# Patient Record
Sex: MALE | Race: BLACK OR AFRICAN AMERICAN | NOT HISPANIC OR LATINO | Employment: FULL TIME | RURAL
[De-identification: names, ages, dates, MRNs, and addresses within clinical notes are randomized per-mention and may not be internally consistent; named-entity substitution may affect disease eponyms.]

---

## 2020-08-20 ENCOUNTER — HISTORICAL (OUTPATIENT)
Dept: ADMINISTRATIVE | Facility: HOSPITAL | Age: 40
End: 2020-08-20

## 2020-08-21 ENCOUNTER — HISTORICAL (OUTPATIENT)
Dept: ADMINISTRATIVE | Facility: HOSPITAL | Age: 40
End: 2020-08-21

## 2020-08-21 LAB
ALBUMIN SERPL BCP-MCNC: 3.9 G/DL (ref 3.5–5)
ALBUMIN/GLOB SERPL: 1.1 {RATIO}
ALP SERPL-CCNC: 79 U/L (ref 45–115)
ALT SERPL W P-5'-P-CCNC: 21 U/L (ref 16–61)
ANION GAP SERPL CALCULATED.3IONS-SCNC: 9 MMOL/L (ref 7–16)
AST SERPL W P-5'-P-CCNC: 17 U/L (ref 15–37)
BASOPHILS # BLD AUTO: 0.03 X10E3/UL (ref 0–0.2)
BASOPHILS NFR BLD AUTO: 0.5 % (ref 0–1)
BILIRUB SERPL-MCNC: 0.2 MG/DL (ref 0–1.2)
BUN SERPL-MCNC: 19 MG/DL (ref 7–18)
BUN/CREAT SERPL: 20
CALCIUM SERPL-MCNC: 8.4 MG/DL (ref 8.5–10.1)
CHLORIDE SERPL-SCNC: 107 MMOL/L (ref 98–107)
CO2 SERPL-SCNC: 29 MMOL/L (ref 21–32)
CREAT SERPL-MCNC: 0.94 MG/DL (ref 0.7–1.3)
EOSINOPHIL # BLD AUTO: 0.23 X10E3/UL (ref 0–0.5)
EOSINOPHIL NFR BLD AUTO: 3.7 % (ref 1–4)
ERYTHROCYTE [DISTWIDTH] IN BLOOD BY AUTOMATED COUNT: 13.4 % (ref 11.5–14.5)
GLOBULIN SER-MCNC: 3.4 G/DL (ref 2–4)
GLUCOSE SERPL-MCNC: 88 MG/DL (ref 74–106)
HCT VFR BLD AUTO: 40.1 % (ref 40–54)
HGB BLD-MCNC: 13.2 G/DL (ref 13.5–18)
HIV 1+O+2 AB SERPL QL: ABNORMAL
HIV 1+O+2 AB SERPL QL: ABNORMAL
IMM GRANULOCYTES # BLD AUTO: 0.01 X10E3/UL (ref 0–0.04)
IMM GRANULOCYTES NFR BLD: 0.2 % (ref 0–0.4)
LYMPHOCYTES # BLD AUTO: 3.87 X10E3/UL (ref 1–4.8)
LYMPHOCYTES NFR BLD AUTO: 62.1 % (ref 27–41)
MCH RBC QN AUTO: 32 PG (ref 27–31)
MCHC RBC AUTO-ENTMCNC: 32.9 G/DL (ref 32–36)
MCV RBC AUTO: 97.3 FL (ref 80–96)
MONOCYTES # BLD AUTO: 0.49 X10E3/UL (ref 0–0.8)
MONOCYTES NFR BLD AUTO: 7.9 % (ref 2–6)
MPC BLD CALC-MCNC: 11.3 FL (ref 9.4–12.4)
NEUTROPHILS # BLD AUTO: 1.6 X10E3/UL (ref 1.8–7.7)
NEUTROPHILS NFR BLD AUTO: 25.6 % (ref 53–65)
NRBC # BLD AUTO: 0 X10E3/UL (ref 0–0)
NRBC, AUTO (.00): 0 /100 (ref 0–0)
PLATELET # BLD AUTO: 169 X10E3/UL (ref 150–400)
POTASSIUM SERPL-SCNC: 3.8 MMOL/L (ref 3.5–5.1)
PROT SERPL-MCNC: 7.3 G/DL (ref 6.4–8.2)
RBC # BLD AUTO: 4.12 X10E6/UL (ref 4.6–6.2)
SODIUM SERPL-SCNC: 141 MMOL/L (ref 136–145)
WBC # BLD AUTO: 6.23 X10E3/UL (ref 4.5–11)

## 2020-08-24 LAB
HIV 2 AB SERPLBLD QL IA.RAPID: NEGATIVE
HIV1 AB SERPLBLD QL IA.RAPID: POSITIVE

## 2021-01-28 ENCOUNTER — HISTORICAL (OUTPATIENT)
Dept: ADMINISTRATIVE | Facility: HOSPITAL | Age: 41
End: 2021-01-28

## 2021-02-02 ENCOUNTER — HISTORICAL (OUTPATIENT)
Dept: ADMINISTRATIVE | Facility: HOSPITAL | Age: 41
End: 2021-02-02

## 2021-06-09 ENCOUNTER — OFFICE VISIT (OUTPATIENT)
Dept: PRIMARY CARE CLINIC | Facility: CLINIC | Age: 41
End: 2021-06-09
Payer: COMMERCIAL

## 2021-06-09 VITALS
OXYGEN SATURATION: 99 % | DIASTOLIC BLOOD PRESSURE: 96 MMHG | WEIGHT: 174 LBS | TEMPERATURE: 99 F | HEIGHT: 69 IN | HEART RATE: 82 BPM | SYSTOLIC BLOOD PRESSURE: 151 MMHG | BODY MASS INDEX: 25.77 KG/M2

## 2021-06-09 DIAGNOSIS — J06.9 ACUTE RESPIRATORY DISEASE: Primary | ICD-10-CM

## 2021-06-09 PROCEDURE — 96372 THER/PROPH/DIAG INJ SC/IM: CPT | Mod: ,,, | Performed by: NURSE PRACTITIONER

## 2021-06-09 PROCEDURE — 99213 PR OFFICE/OUTPT VISIT, EST, LEVL III, 20-29 MIN: ICD-10-PCS | Mod: 25,,, | Performed by: NURSE PRACTITIONER

## 2021-06-09 PROCEDURE — 96372 PR INJECTION,THERAP/PROPH/DIAG2ST, IM OR SUBCUT: ICD-10-PCS | Mod: ,,, | Performed by: NURSE PRACTITIONER

## 2021-06-09 PROCEDURE — 99213 OFFICE O/P EST LOW 20 MIN: CPT | Mod: 25,,, | Performed by: NURSE PRACTITIONER

## 2021-06-09 RX ORDER — DEXAMETHASONE SODIUM PHOSPHATE 4 MG/ML
4 INJECTION, SOLUTION INTRA-ARTICULAR; INTRALESIONAL; INTRAMUSCULAR; INTRAVENOUS; SOFT TISSUE
Status: COMPLETED | OUTPATIENT
Start: 2021-06-09 | End: 2021-06-09

## 2021-06-09 RX ORDER — CEFTRIAXONE 1 G/1
1 INJECTION, POWDER, FOR SOLUTION INTRAMUSCULAR; INTRAVENOUS
Status: COMPLETED | OUTPATIENT
Start: 2021-06-09 | End: 2021-06-09

## 2021-06-09 RX ORDER — METHYLPREDNISOLONE 4 MG/1
TABLET ORAL
Qty: 1 PACKAGE | Refills: 0 | Status: SHIPPED | OUTPATIENT
Start: 2021-06-09 | End: 2021-06-30

## 2021-06-09 RX ORDER — LISINOPRIL 20 MG/1
20 TABLET ORAL DAILY
COMMUNITY
Start: 2021-04-26 | End: 2023-05-22

## 2021-06-09 RX ORDER — PROMETHAZINE HYDROCHLORIDE AND DEXTROMETHORPHAN HYDROBROMIDE 6.25; 15 MG/5ML; MG/5ML
5 SYRUP ORAL EVERY 4 HOURS PRN
Qty: 175 ML | Refills: 0 | Status: SHIPPED | OUTPATIENT
Start: 2021-06-09 | End: 2021-06-19

## 2021-06-09 RX ORDER — DOXYCYCLINE 100 MG/1
100 CAPSULE ORAL 2 TIMES DAILY
Qty: 20 CAPSULE | Refills: 0 | Status: SHIPPED | OUTPATIENT
Start: 2021-06-09 | End: 2023-03-07

## 2021-06-09 RX ADMIN — CEFTRIAXONE 1 G: 1 INJECTION, POWDER, FOR SOLUTION INTRAMUSCULAR; INTRAVENOUS at 09:06

## 2021-06-09 RX ADMIN — DEXAMETHASONE SODIUM PHOSPHATE 4 MG: 4 INJECTION, SOLUTION INTRA-ARTICULAR; INTRALESIONAL; INTRAMUSCULAR; INTRAVENOUS; SOFT TISSUE at 09:06

## 2021-08-17 ENCOUNTER — OFFICE VISIT (OUTPATIENT)
Dept: PRIMARY CARE CLINIC | Facility: CLINIC | Age: 41
End: 2021-08-17
Payer: COMMERCIAL

## 2021-08-17 DIAGNOSIS — Z02.1 ENCOUNTER FOR PRE-EMPLOYMENT EXAMINATION: Primary | ICD-10-CM

## 2021-08-17 PROCEDURE — 99499 NO LOS: ICD-10-PCS | Mod: ,,, | Performed by: NURSE PRACTITIONER

## 2021-08-17 PROCEDURE — 99499 UNLISTED E&M SERVICE: CPT | Mod: ,,, | Performed by: NURSE PRACTITIONER

## 2022-01-19 ENCOUNTER — OFFICE VISIT (OUTPATIENT)
Dept: PRIMARY CARE CLINIC | Facility: CLINIC | Age: 42
End: 2022-01-19
Payer: COMMERCIAL

## 2022-01-19 VITALS
WEIGHT: 185 LBS | BODY MASS INDEX: 27.4 KG/M2 | RESPIRATION RATE: 18 BRPM | SYSTOLIC BLOOD PRESSURE: 145 MMHG | OXYGEN SATURATION: 100 % | DIASTOLIC BLOOD PRESSURE: 90 MMHG | HEIGHT: 69 IN | HEART RATE: 72 BPM | TEMPERATURE: 98 F
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
## 2022-01-19 DIAGNOSIS — M54.9 BACK PAIN, UNSPECIFIED BACK LOCATION, UNSPECIFIED BACK PAIN LATERALITY, UNSPECIFIED CHRONICITY: ICD-10-CM

## 2022-01-19 DIAGNOSIS — M62.830 BACK MUSCLE SPASM: Primary | ICD-10-CM

## 2022-01-19 DIAGNOSIS — B20 HIV INFECTION, UNSPECIFIED SYMPTOM STATUS: ICD-10-CM

## 2022-01-19 DIAGNOSIS — M54.2 PAIN IN NECK: ICD-10-CM

## 2022-01-19 PROCEDURE — 99214 PR OFFICE/OUTPT VISIT, EST, LEVL IV, 30-39 MIN: ICD-10-PCS | Mod: 25,,, | Performed by: NURSE PRACTITIONER

## 2022-01-19 PROCEDURE — 96372 THER/PROPH/DIAG INJ SC/IM: CPT | Mod: ,,, | Performed by: NURSE PRACTITIONER

## 2022-01-19 PROCEDURE — 99214 OFFICE O/P EST MOD 30 MIN: CPT | Mod: 25,,, | Performed by: NURSE PRACTITIONER

## 2022-01-19 PROCEDURE — 96372 PR INJECTION,THERAP/PROPH/DIAG2ST, IM OR SUBCUT: ICD-10-PCS | Mod: ,,, | Performed by: NURSE PRACTITIONER

## 2022-01-19 RX ORDER — KETOROLAC TROMETHAMINE 30 MG/ML
30 INJECTION, SOLUTION INTRAMUSCULAR; INTRAVENOUS
Status: COMPLETED | OUTPATIENT
Start: 2022-01-19 | End: 2022-01-19

## 2022-01-19 RX ORDER — TIZANIDINE 4 MG/1
4 TABLET ORAL EVERY 6 HOURS PRN
Qty: 30 TABLET | Refills: 0 | Status: SHIPPED | OUTPATIENT
Start: 2022-01-19 | End: 2022-01-29

## 2022-01-19 RX ORDER — MELOXICAM 15 MG/1
15 TABLET ORAL DAILY
Qty: 30 TABLET | Refills: 0 | Status: SHIPPED | OUTPATIENT
Start: 2022-01-19 | End: 2023-03-07 | Stop reason: ALTCHOICE

## 2022-01-19 RX ADMIN — KETOROLAC TROMETHAMINE 30 MG: 30 INJECTION, SOLUTION INTRAMUSCULAR; INTRAVENOUS at 10:01
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
## 2022-01-19 NOTE — LETTER
January 19, 2022      [facility]  [address] [facility] [place] [address]  Phone: [phone]  Fax: [phone]       Patient: Faustino Finney   YOB: 1980  Date of Visit: 01/19/2022    To Whom It May Concern:    Domingo Finney  was at  on 01/19/2022. The patient may return to work/school on 1/21 without restrictions. If you have any questions or concerns, or if I can be of further assistance, please do not hesitate to contact me.    Sincerely,    Helga Delgado, NP

## 2023-03-07 ENCOUNTER — OFFICE VISIT (OUTPATIENT)
Dept: PRIMARY CARE CLINIC | Facility: CLINIC | Age: 43
End: 2023-03-07
Payer: COMMERCIAL

## 2023-03-07 VITALS
SYSTOLIC BLOOD PRESSURE: 169 MMHG | BODY MASS INDEX: 27.7 KG/M2 | WEIGHT: 187 LBS | HEIGHT: 69 IN | DIASTOLIC BLOOD PRESSURE: 93 MMHG | OXYGEN SATURATION: 98 % | HEART RATE: 91 BPM | TEMPERATURE: 98 F | RESPIRATION RATE: 18 BRPM

## 2023-03-07 DIAGNOSIS — R10.9 FLANK PAIN: Primary | ICD-10-CM

## 2023-03-07 LAB
ALBUMIN SERPL BCP-MCNC: 3.6 G/DL (ref 3.5–5)
ALBUMIN/GLOB SERPL: 0.8 {RATIO}
ALP SERPL-CCNC: 73 U/L (ref 45–115)
ALT SERPL W P-5'-P-CCNC: 45 U/L (ref 16–61)
ANION GAP SERPL CALCULATED.3IONS-SCNC: 5 MMOL/L (ref 7–16)
AST SERPL W P-5'-P-CCNC: 31 U/L (ref 15–37)
BASOPHILS # BLD AUTO: 0.01 K/UL (ref 0–0.2)
BASOPHILS NFR BLD AUTO: 0.3 % (ref 0–1)
BILIRUB SERPL-MCNC: 0.4 MG/DL (ref ?–1.2)
BILIRUB SERPL-MCNC: ABNORMAL MG/DL
BLOOD URINE, POC: ABNORMAL
BUN SERPL-MCNC: 16 MG/DL (ref 7–18)
BUN/CREAT SERPL: 17 (ref 6–20)
CALCIUM SERPL-MCNC: 8.4 MG/DL (ref 8.5–10.1)
CHLORIDE SERPL-SCNC: 109 MMOL/L (ref 98–107)
CLARITY UR: CLEAR
CO2 SERPL-SCNC: 28 MMOL/L (ref 21–32)
COLOR, POC UA: YELLOW
CREAT SERPL-MCNC: 0.93 MG/DL (ref 0.7–1.3)
DIFFERENTIAL METHOD BLD: ABNORMAL
EGFR (NO RACE VARIABLE) (RUSH/TITUS): 105 ML/MIN/1.73M²
EOSINOPHIL # BLD AUTO: 0.15 K/UL (ref 0–0.5)
EOSINOPHIL NFR BLD AUTO: 4.4 % (ref 1–4)
ERYTHROCYTE [DISTWIDTH] IN BLOOD BY AUTOMATED COUNT: 13.2 % (ref 11.5–14.5)
GLOBULIN SER-MCNC: 4.3 G/DL (ref 2–4)
GLUCOSE SERPL-MCNC: 152 MG/DL (ref 74–106)
GLUCOSE UR QL STRIP: ABNORMAL
HCT VFR BLD AUTO: 37 % (ref 40–54)
HGB BLD-MCNC: 12.9 G/DL (ref 13.5–18)
IMM GRANULOCYTES # BLD AUTO: 0.01 K/UL (ref 0–0.04)
IMM GRANULOCYTES NFR BLD: 0.3 % (ref 0–0.4)
KETONES UR QL STRIP: ABNORMAL
LEUKOCYTE ESTERASE URINE, POC: ABNORMAL
LYMPHOCYTES # BLD AUTO: 1.59 K/UL (ref 1–4.8)
LYMPHOCYTES NFR BLD AUTO: 46.8 % (ref 27–41)
MCH RBC QN AUTO: 31.6 PG (ref 27–31)
MCHC RBC AUTO-ENTMCNC: 34.9 G/DL (ref 32–36)
MCV RBC AUTO: 90.7 FL (ref 80–96)
MONOCYTES # BLD AUTO: 0.39 K/UL (ref 0–0.8)
MONOCYTES NFR BLD AUTO: 11.5 % (ref 2–6)
MPC BLD CALC-MCNC: 10.6 FL (ref 9.4–12.4)
NEUTROPHILS # BLD AUTO: 1.25 K/UL (ref 1.8–7.7)
NEUTROPHILS NFR BLD AUTO: 36.7 % (ref 53–65)
NITRITE, POC UA: ABNORMAL
NRBC # BLD AUTO: 0 X10E3/UL
NRBC, AUTO (.00): 0 %
PH, POC UA: 6
PLATELET # BLD AUTO: 168 K/UL (ref 150–400)
POTASSIUM SERPL-SCNC: 3.9 MMOL/L (ref 3.5–5.1)
PROT SERPL-MCNC: 7.9 G/DL (ref 6.4–8.2)
PROTEIN, POC: ABNORMAL
RBC # BLD AUTO: 4.08 M/UL (ref 4.6–6.2)
SODIUM SERPL-SCNC: 138 MMOL/L (ref 136–145)
SPECIFIC GRAVITY, POC UA: >1.03
UROBILINOGEN, POC UA: 0.2
WBC # BLD AUTO: 3.4 K/UL (ref 4.5–11)

## 2023-03-07 PROCEDURE — 3008F BODY MASS INDEX DOCD: CPT | Mod: ,,, | Performed by: NURSE PRACTITIONER

## 2023-03-07 PROCEDURE — 99214 OFFICE O/P EST MOD 30 MIN: CPT | Mod: ,,, | Performed by: NURSE PRACTITIONER

## 2023-03-07 PROCEDURE — 3008F PR BODY MASS INDEX (BMI) DOCUMENTED: ICD-10-PCS | Mod: ,,, | Performed by: NURSE PRACTITIONER

## 2023-03-07 PROCEDURE — 81003 POCT URINALYSIS W/O SCOPE: ICD-10-PCS | Mod: QW,,, | Performed by: NURSE PRACTITIONER

## 2023-03-07 PROCEDURE — 80053 COMPREHENSIVE METABOLIC PANEL: ICD-10-PCS | Mod: ,,, | Performed by: CLINICAL MEDICAL LABORATORY

## 2023-03-07 PROCEDURE — 3080F DIAST BP >= 90 MM HG: CPT | Mod: ,,, | Performed by: NURSE PRACTITIONER

## 2023-03-07 PROCEDURE — 3080F PR MOST RECENT DIASTOLIC BLOOD PRESSURE >= 90 MM HG: ICD-10-PCS | Mod: ,,, | Performed by: NURSE PRACTITIONER

## 2023-03-07 PROCEDURE — 1159F MED LIST DOCD IN RCRD: CPT | Mod: ,,, | Performed by: NURSE PRACTITIONER

## 2023-03-07 PROCEDURE — 3077F SYST BP >= 140 MM HG: CPT | Mod: ,,, | Performed by: NURSE PRACTITIONER

## 2023-03-07 PROCEDURE — 1159F PR MEDICATION LIST DOCUMENTED IN MEDICAL RECORD: ICD-10-PCS | Mod: ,,, | Performed by: NURSE PRACTITIONER

## 2023-03-07 PROCEDURE — 81003 URINALYSIS AUTO W/O SCOPE: CPT | Mod: QW,,, | Performed by: NURSE PRACTITIONER

## 2023-03-07 PROCEDURE — 85025 CBC WITH DIFFERENTIAL: ICD-10-PCS | Mod: ,,, | Performed by: CLINICAL MEDICAL LABORATORY

## 2023-03-07 PROCEDURE — 80053 COMPREHEN METABOLIC PANEL: CPT | Mod: ,,, | Performed by: CLINICAL MEDICAL LABORATORY

## 2023-03-07 PROCEDURE — 3077F PR MOST RECENT SYSTOLIC BLOOD PRESSURE >= 140 MM HG: ICD-10-PCS | Mod: ,,, | Performed by: NURSE PRACTITIONER

## 2023-03-07 PROCEDURE — 99214 PR OFFICE/OUTPT VISIT, EST, LEVL IV, 30-39 MIN: ICD-10-PCS | Mod: ,,, | Performed by: NURSE PRACTITIONER

## 2023-03-07 PROCEDURE — 85025 COMPLETE CBC W/AUTO DIFF WBC: CPT | Mod: ,,, | Performed by: CLINICAL MEDICAL LABORATORY

## 2023-03-07 RX ORDER — IBUPROFEN 800 MG/1
800 TABLET ORAL 3 TIMES DAILY
COMMUNITY
Start: 2023-03-06 | End: 2023-05-22

## 2023-03-07 RX ORDER — METHYLPREDNISOLONE 4 MG/1
TABLET ORAL
Qty: 21 EACH | Refills: 0 | Status: SHIPPED | OUTPATIENT
Start: 2023-03-07 | End: 2023-03-28

## 2023-03-07 NOTE — PROGRESS NOTES
NEW CLINIC NOTE    Faustino Finney is a 42 y.o. Black or  male     Chief Complaint   Patient presents with    Back Pain    Dysuria        SUBJECTIVE  Pt presents to clinic today with a two month history of right sided back pain. Reports he feels as if it is his kidneys. Reports he has been drinking propel, water, and taking a vitamin, getting no worse. Reports he has not noticed burning during urination. Denies any abd pain. 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 Reports he can lean to left side and feel slightly better but not completely relieved.      Current Outpatient Medications on File Prior to Visit   Medication Sig Dispense Refill    ibuprofen (ADVIL,MOTRIN) 800 MG tablet Take 800 mg by mouth 3 (three) times daily.      lisinopriL (PRINIVIL,ZESTRIL) 20 MG tablet Take 20 mg by mouth once daily.       No current facility-administered medications on file prior to visit.      Review of patient's allergies indicates:  No Known Allergies     Past Medical History:   Diagnosis Date    HIV infection     Hyperlipidemia     Hypertension       Past Surgical History:   Procedure Laterality Date    SURGICAL REMOVAL OF LYMPH NODE Right     Removed from right armpit     History reviewed. No pertinent family history.  Social History     Socioeconomic History    Marital status:    Tobacco Use    Smoking status: Every Day    Smokeless tobacco: Never   Substance and Sexual Activity    Alcohol use: Yes    Drug use: Never        Lab Results   Component Value Date    WBC 3.40 (L) 03/07/2023    HGB 12.9 (L) 03/07/2023    HCT 37.0 (L) 03/07/2023    MCV 90.7 03/07/2023     03/07/2023        CMP  Sodium   Date Value Ref Range Status   03/07/2023 138 136 - 145 mmol/L Final     Potassium   Date Value Ref Range Status   03/07/2023 3.9 3.5 - 5.1 mmol/L Final     Chloride   Date Value Ref Range Status   03/07/2023 109 (H) 98 - 107 mmol/L Final     CO2   Date Value Ref Range  "Status   03/07/2023 28 21 - 32 mmol/L Final     Glucose   Date Value Ref Range Status   03/07/2023 152 (H) 74 - 106 mg/dL Final     BUN   Date Value Ref Range Status   03/07/2023 16 7 - 18 mg/dL Final     Creatinine   Date Value Ref Range Status   03/07/2023 0.93 0.70 - 1.30 mg/dL Final     Calcium   Date Value Ref Range Status   03/07/2023 8.4 (L) 8.5 - 10.1 mg/dL Final     Total Protein   Date Value Ref Range Status   03/07/2023 7.9 6.4 - 8.2 g/dL Final     Albumin   Date Value Ref Range Status   03/07/2023 3.6 3.5 - 5.0 g/dL Final     Bilirubin, Total   Date Value Ref Range Status   03/07/2023 0.4 >0.0 - 1.2 mg/dL Final     Alk Phos   Date Value Ref Range Status   03/07/2023 73 45 - 115 U/L Final     AST   Date Value Ref Range Status   03/07/2023 31 15 - 37 U/L Final     ALT   Date Value Ref Range Status   03/07/2023 45 16 - 61 U/L Final     Anion Gap   Date Value Ref Range Status   03/07/2023 5 (L) 7 - 16 mmol/L Final     eGFR    Date Value Ref Range Status   08/20/2020 114       Comment:     The above 20 analytes were performed by [facility] [id number] [place] [address],[place] [id number]     eGFR   Date Value Ref Range Status   08/20/2020 94       No results found for: LABA1C, HGBA1C      OBJECTIVE  BP (!) 169/93 (BP Location: Left arm, Patient Position: Sitting)   Pulse 91   Temp 97.8 °F (36.6 °C)   Resp 18   Ht 5' 9" (1.753 m)   Wt 84.8 kg (187 lb)   SpO2 98%   BMI 27.62 kg/m²      Physical Exam  Vitals and nursing note reviewed.   Constitutional:       Appearance: Normal appearance. He is normal weight.   HENT:      Mouth/Throat:      Mouth: Mucous membranes are moist.   Cardiovascular:      Rate and Rhythm: Normal rate and regular rhythm.      Pulses: Normal pulses.      Heart sounds: Normal heart sounds.   Pulmonary:      Effort: Pulmonary effort is normal.      Breath sounds: Normal breath sounds.   Musculoskeletal:         General: Tenderness present. Normal range of motion.      " Cervical back: Normal range of motion and neck supple.      Comments: Tenderness to R lumbar paraspinal muscles. Tightness R>L lumbar paraspinal muscles. Able to flex and extend lumbar spine with minimal inhibitions.   Skin:     Capillary Refill: Capillary refill takes less than 2 seconds.   Neurological:      Mental Status: He is alert. Mental status is at baseline.   Psychiatric:         Behavior: Behavior normal.          ASSESSMENT & PLAN  1. Flank pain         Problem List Items Addressed This Visit    None  Visit Diagnoses       Flank pain    -  Primary    Relevant Medications    methylPREDNISolone (MEDROL DOSEPACK) 4 mg tablet    Other Relevant Orders    POCT URINALYSIS W/O SCOPE (Completed)    Comprehensive Metabolic Panel (Completed)    CBC Auto Differential (Completed)            RTC in one week if no better. Will consider PT

## 2023-03-08 DIAGNOSIS — R73.9 HYPERGLYCEMIA: Primary | ICD-10-CM

## 2023-03-21 ENCOUNTER — OFFICE VISIT (OUTPATIENT)
Dept: PRIMARY CARE CLINIC | Facility: CLINIC | Age: 43
End: 2023-03-21
Payer: COMMERCIAL

## 2023-03-21 ENCOUNTER — HOSPITAL ENCOUNTER (OUTPATIENT)
Dept: RADIOLOGY | Facility: HOSPITAL | Age: 43
Discharge: HOME | End: 2023-03-21
Attending: NURSE PRACTITIONER
Payer: COMMERCIAL

## 2023-03-21 VITALS
RESPIRATION RATE: 18 BRPM | HEIGHT: 69 IN | WEIGHT: 189 LBS | HEART RATE: 88 BPM | DIASTOLIC BLOOD PRESSURE: 86 MMHG | BODY MASS INDEX: 27.99 KG/M2 | OXYGEN SATURATION: 99 % | SYSTOLIC BLOOD PRESSURE: 148 MMHG | TEMPERATURE: 98 F
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
## 2023-03-21 DIAGNOSIS — B20 HIV INFECTION, UNSPECIFIED SYMPTOM STATUS: ICD-10-CM

## 2023-03-21 PROCEDURE — 3008F PR BODY MASS INDEX (BMI) DOCUMENTED: ICD-10-PCS | Mod: ,,, | Performed by: NURSE PRACTITIONER

## 2023-03-21 PROCEDURE — 1159F MED LIST DOCD IN RCRD: CPT | Mod: ,,, | Performed by: NURSE PRACTITIONER

## 2023-03-21 PROCEDURE — 1159F PR MEDICATION LIST DOCUMENTED IN MEDICAL RECORD: ICD-10-PCS | Mod: ,,, | Performed by: NURSE PRACTITIONER

## 2023-03-21 PROCEDURE — 3077F PR MOST RECENT SYSTOLIC BLOOD PRESSURE >= 140 MM HG: ICD-10-PCS | Mod: ,,, | Performed by: NURSE PRACTITIONER

## 2023-03-21 PROCEDURE — 99214 OFFICE O/P EST MOD 30 MIN: CPT | Mod: ,,, | Performed by: NURSE PRACTITIONER

## 2023-03-21 PROCEDURE — 3008F BODY MASS INDEX DOCD: CPT | Mod: ,,, | Performed by: NURSE PRACTITIONER

## 2023-03-21 PROCEDURE — 72100 X-RAY EXAM L-S SPINE 2/3 VWS: CPT | Mod: TC

## 2023-03-21 PROCEDURE — 3079F PR MOST RECENT DIASTOLIC BLOOD PRESSURE 80-89 MM HG: ICD-10-PCS | Mod: ,,, | Performed by: NURSE PRACTITIONER

## 2023-03-21 PROCEDURE — 99214 PR OFFICE/OUTPT VISIT, EST, LEVL IV, 30-39 MIN: ICD-10-PCS | Mod: ,,, | Performed by: NURSE PRACTITIONER

## 2023-03-21 PROCEDURE — 73522 X-RAY EXAM HIPS BI 3-4 VIEWS: CPT | Mod: TC

## 2023-03-21 PROCEDURE — 3079F DIAST BP 80-89 MM HG: CPT | Mod: ,,, | Performed by: NURSE PRACTITIONER

## 2023-03-21 PROCEDURE — 3077F SYST BP >= 140 MM HG: CPT | Mod: ,,, | Performed by: NURSE PRACTITIONER

## 2023-03-21 RX ORDER — DICLOFENAC SODIUM 50 MG/1
50 TABLET, DELAYED RELEASE ORAL 2 TIMES DAILY PRN
Qty: 30 TABLET | Refills: 0 | Status: SHIPPED | OUTPATIENT
Start: 2023-03-21 | End: 2023-05-22 | Stop reason: SDUPTHER

## 2023-03-21 NOTE — PROGRESS NOTES
NEW CLINIC NOTE    Faustino Finney is a 42 y.o. Black or  male     Chief Complaint   Patient presents with    Fall     Fell at work        SUBJECTIVE  Pt presents to clinic after fall. Reports he fell down a flight of 5 steps after tripping at work. Landed on backside on concrete. Reports he immediately got up without gait issues. Reports he was in pain but bearable and able to walk off. Has continued to work but reports he has occasional pain lingering since fall. Reports entire backside and tailbone are sore. Reports he has a deep pressure when his bladder is full. No pain during urination. No blood in urine. Did have blood in stool immediately following fall but that has resolved. Denies taking anything for pain other than ibuprofen. Reports ibuprofen helps ease the pain but does not help resolve.      Current Outpatient Medications on File Prior to Visit   Medication Sig Dispense Refill    ibuprofen (ADVIL,MOTRIN) 800 MG tablet Take 800 mg by mouth 3 (three) times daily.      lisinopriL (PRINIVIL,ZESTRIL) 20 MG tablet Take 20 mg by mouth once daily.      methylPREDNISolone (MEDROL DOSEPACK) 4 mg tablet use as directed 21 each 0     No current facility-administered medications on file prior to visit.      Review of patient's allergies indicates:  No Known Allergies     Past Medical History:   Diagnosis Date    HIV infection     Hyperlipidemia     Hypertension       Past Surgical History:   Procedure Laterality Date    SURGICAL REMOVAL OF LYMPH NODE Right     Removed from right armpit     History reviewed. No pertinent family history.  Social History     Socioeconomic History    Marital status:    Tobacco Use    Smoking status: Every Day    Smokeless tobacco: Never   Substance and Sexual Activity    Alcohol use: Yes    Drug use: Never        Lab Results   Component Value Date    WBC 3.40 (L) 03/07/2023    HGB 12.9 (L) 03/07/2023    HCT 37.0 (L) 03/07/2023    MCV 90.7 03/07/2023    PLT  "168 03/07/2023        CMP  Sodium   Date Value Ref Range Status   03/07/2023 138 136 - 145 mmol/L Final     Potassium   Date Value Ref Range Status   03/07/2023 3.9 3.5 - 5.1 mmol/L Final     Chloride   Date Value Ref Range Status   03/07/2023 109 (H) 98 - 107 mmol/L Final     CO2   Date Value Ref Range Status   03/07/2023 28 21 - 32 mmol/L Final     Glucose   Date Value Ref Range Status   03/07/2023 152 (H) 74 - 106 mg/dL Final     BUN   Date Value Ref Range Status   03/07/2023 16 7 - 18 mg/dL Final     Creatinine   Date Value Ref Range Status   03/07/2023 0.93 0.70 - 1.30 mg/dL Final     Calcium   Date Value Ref Range Status   03/07/2023 8.4 (L) 8.5 - 10.1 mg/dL Final     Total Protein   Date Value Ref Range Status   03/07/2023 7.9 6.4 - 8.2 g/dL Final     Albumin   Date Value Ref Range Status   03/07/2023 3.6 3.5 - 5.0 g/dL Final     Bilirubin, Total   Date Value Ref Range Status   03/07/2023 0.4 >0.0 - 1.2 mg/dL Final     Alk Phos   Date Value Ref Range Status   03/07/2023 73 45 - 115 U/L Final     AST   Date Value Ref Range Status   03/07/2023 31 15 - 37 U/L Final     ALT   Date Value Ref Range Status   03/07/2023 45 16 - 61 U/L Final     Anion Gap   Date Value Ref Range Status   03/07/2023 5 (L) 7 - 16 mmol/L Final     eGFR    Date Value Ref Range Status   08/20/2020 114       Comment:     The above 20 analytes were performed by [facility] [id number] [place] [address],[place] [id number]     eGFR   Date Value Ref Range Status   08/20/2020 94       No results found for: LABA1C, HGBA1C      OBJECTIVE  BP (!) 148/86 (BP Location: Right arm, Patient Position: Sitting, BP Method: Medium (Automatic))   Pulse 88   Temp 98.2 °F (36.8 °C)   Resp 18   Ht 5' 9" (1.753 m)   Wt 85.7 kg (189 lb)   SpO2 99%   BMI 27.91 kg/m²      Physical Exam  Vitals and nursing note reviewed.   Constitutional:       Appearance: Normal appearance. He is obese.   HENT:      Mouth/Throat:      Mouth: Mucous membranes are " moist.   Cardiovascular:      Rate and Rhythm: Normal rate and regular rhythm.      Pulses: Normal pulses.      Heart sounds: Normal heart sounds.   Pulmonary:      Effort: Pulmonary effort is normal.      Breath sounds: Normal breath sounds.   Musculoskeletal:         General: Normal range of motion.      Cervical back: Normal range of motion.      Comments: Able to climb on and off exam table with ease   Skin:     General: Skin is warm.      Capillary Refill: Capillary refill takes less than 2 seconds.   Neurological:      Mental Status: He is alert. Mental status is at baseline.   Psychiatric:         Behavior: Behavior normal.          ASSESSMENT & PLAN  1. Fall on concrete    2. HIV infection, unspecified symptom status         Problem List Items Addressed This Visit          ID    HIV infection       Orthopedic    Fall on concrete - Primary    Relevant Orders    X-Ray Lumbar Spine AP And Lateral (Completed)    X-Ray Hip 3 or 4 views Bilateral (Completed)       No follow-ups on file.

## 2023-03-22 PROBLEM — B20 HIV INFECTION: Status: ACTIVE | Noted: 2023-03-22
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
## 2023-05-22 ENCOUNTER — OFFICE VISIT (OUTPATIENT)
Dept: PRIMARY CARE CLINIC | Facility: CLINIC | Age: 43
End: 2023-05-22
Payer: COMMERCIAL

## 2023-05-22 VITALS
BODY MASS INDEX: 27.95 KG/M2 | OXYGEN SATURATION: 100 % | HEIGHT: 69 IN | DIASTOLIC BLOOD PRESSURE: 97 MMHG | HEART RATE: 82 BPM | WEIGHT: 188.69 LBS | TEMPERATURE: 98 F | RESPIRATION RATE: 18 BRPM | SYSTOLIC BLOOD PRESSURE: 163 MMHG

## 2023-05-22 DIAGNOSIS — G89.29 CHRONIC RIGHT-SIDED LOW BACK PAIN, UNSPECIFIED WHETHER SCIATICA PRESENT: ICD-10-CM

## 2023-05-22 DIAGNOSIS — I10 PRIMARY HYPERTENSION: Primary | ICD-10-CM

## 2023-05-22 DIAGNOSIS — M54.50 CHRONIC RIGHT-SIDED LOW BACK PAIN, UNSPECIFIED WHETHER SCIATICA PRESENT: ICD-10-CM

## 2023-05-22 PROCEDURE — 3080F DIAST BP >= 90 MM HG: CPT | Mod: ,,, | Performed by: STUDENT IN AN ORGANIZED HEALTH CARE EDUCATION/TRAINING PROGRAM

## 2023-05-22 PROCEDURE — 3008F PR BODY MASS INDEX (BMI) DOCUMENTED: ICD-10-PCS | Mod: ,,, | Performed by: STUDENT IN AN ORGANIZED HEALTH CARE EDUCATION/TRAINING PROGRAM

## 2023-05-22 PROCEDURE — 3008F BODY MASS INDEX DOCD: CPT | Mod: ,,, | Performed by: STUDENT IN AN ORGANIZED HEALTH CARE EDUCATION/TRAINING PROGRAM

## 2023-05-22 PROCEDURE — 99214 OFFICE O/P EST MOD 30 MIN: CPT | Mod: ,,, | Performed by: STUDENT IN AN ORGANIZED HEALTH CARE EDUCATION/TRAINING PROGRAM

## 2023-05-22 PROCEDURE — 3080F PR MOST RECENT DIASTOLIC BLOOD PRESSURE >= 90 MM HG: ICD-10-PCS | Mod: ,,, | Performed by: STUDENT IN AN ORGANIZED HEALTH CARE EDUCATION/TRAINING PROGRAM

## 2023-05-22 PROCEDURE — 99214 PR OFFICE/OUTPT VISIT, EST, LEVL IV, 30-39 MIN: ICD-10-PCS | Mod: ,,, | Performed by: STUDENT IN AN ORGANIZED HEALTH CARE EDUCATION/TRAINING PROGRAM

## 2023-05-22 PROCEDURE — 3077F PR MOST RECENT SYSTOLIC BLOOD PRESSURE >= 140 MM HG: ICD-10-PCS | Mod: ,,, | Performed by: STUDENT IN AN ORGANIZED HEALTH CARE EDUCATION/TRAINING PROGRAM

## 2023-05-22 PROCEDURE — 3077F SYST BP >= 140 MM HG: CPT | Mod: ,,, | Performed by: STUDENT IN AN ORGANIZED HEALTH CARE EDUCATION/TRAINING PROGRAM

## 2023-05-22 RX ORDER — LISINOPRIL 20 MG/1
20 TABLET ORAL DAILY
Qty: 30 TABLET | Refills: 2 | Status: SHIPPED | OUTPATIENT
Start: 2023-05-22 | End: 2023-06-13

## 2023-05-22 RX ORDER — DICLOFENAC SODIUM 50 MG/1
50 TABLET, DELAYED RELEASE ORAL 2 TIMES DAILY PRN
Qty: 30 TABLET | Refills: 2 | Status: SHIPPED | OUTPATIENT
Start: 2023-05-22 | End: 2023-06-13

## 2023-05-22 NOTE — LETTER
May 22, 2023      [facility]  [address] [facility] [address]  Phone: [phone]  Fax: [phone]       Patient: Faustino Finney   YOB: 1980  Date of Visit: 05/22/2023    To Whom It May Concern:    Domingo Finney  was at [facility] on 05/22/2023. The patient may return to work/school on 5/25/23 with no restrictions. If you have any questions or concerns, or if I can be of further assistance, please do not hesitate to contact me.      Sincerely,    Lidia Long MD

## 2023-05-22 NOTE — PROGRESS NOTES
"Subjective:      Faustino Finney is a 42 y.o.male who presents to clinic for Leg Pain (Rt leg pain from groin to knee)      Patient presents to clinic with complaints of feeling off balance that began this morning. He has not fallen or lost consciousness. Characterized as "feeling drunk" and right side feeling heavy. Denies dizziness or hx of DM. Ate and drank regularly yesterday. Denies nausea, vomiting, or diarrhea. He states he has not been taking his blood pressure pills because he felt like he wasn't having problems with it. Symptoms were associated with blurry vision. He is concerned because he feels like it is dangerous for him to be at work with his feeling off balance. Has [employer] (disability number) he wants us to call for his job.     Also complaining of right low back/leg pain that radiates to his knee. It has been on going for many months. X-rays with PCP showed mild degeneration. He states voltaren worked really well for his symptoms but he ran out of them.       Review of Systems   Eyes:  Positive for blurred vision.   Cardiovascular:  Negative for chest pain and palpitations.   Gastrointestinal:  Negative for nausea and vomiting.   Neurological:  Negative for dizziness and headaches.      Past Medical History:  has a past medical history of HIV infection, Hyperlipidemia, and Hypertension.   Past Surgical History:  has a past surgical history that includes Surgical removal of lymph node (Right).  Family History: family history includes Hypertension in his father and mother.  Social History:  reports that he has been smoking cigarettes. He started smoking about 30 years ago. He has been smoking an average of .5 packs per day. He has never used smokeless tobacco. He reports current alcohol use. He reports that he does not use drugs.  Allergies: Review of patient's allergies indicates:  No Known Allergies    Objective:     Vitals:    05/22/23 1452   BP: (!) 163/97   Pulse:    Resp:    Temp:      Physical " Exam  Vitals reviewed.   Constitutional:       General: He is not in acute distress.     Appearance: Normal appearance. He is not ill-appearing.   HENT:      Head: Normocephalic and atraumatic.      Right Ear: External ear normal.      Left Ear: External ear normal.   Eyes:      General: No scleral icterus.        Right eye: No discharge.         Left eye: No discharge.      Conjunctiva/sclera: Conjunctivae normal.   Cardiovascular:      Rate and Rhythm: Normal rate and regular rhythm.      Pulses: Normal pulses.   Pulmonary:      Effort: Pulmonary effort is normal. No respiratory distress.      Breath sounds: Normal breath sounds. No wheezing.   Musculoskeletal:      Cervical back: No tenderness or bony tenderness.      Thoracic back: No tenderness or bony tenderness.      Lumbar back: No tenderness or bony tenderness.      Right lower leg: No edema.      Left lower leg: No edema.   Neurological:      General: No focal deficit present.      Mental Status: He is alert.   Psychiatric:         Behavior: Behavior normal.          Assessment/Plan:     1. Primary hypertension  - symptoms possibly due to uncontrolled HTN as blood pressure check was initially 182/81 and he has not been taking his lisinopril as prescribed  - encouraged pt to restart his medications as that + blurry vision could be from uncontrolled HTN  - lisinopriL (PRINIVIL,ZESTRIL) 20 MG tablet; Take 1 tablet (20 mg total) by mouth once daily.  Dispense: 30 tablet; Refill: 2    2. Chronic right-sided low back pain, unspecified whether sciatica present  - restart voltaren twice daily as needed  - diclofenac (VOLTAREN) 50 MG EC tablet; Take 1 tablet (50 mg total) by mouth 2 (two) times daily as needed (pain).  Dispense: 30 tablet; Refill: 2        Return to clinic in 1 month for f/u HTN or sooner if needed.     Lidia Long MD  Family Medicine  05/22/2023

## 2023-05-22 NOTE — LETTER
May 24, 2023      [facility]  [address] [facility] [address]  Phone: [phone]  Fax: [phone]       Patient: Faustino Finney   YOB: 1980  Date of Visit: 05/24/2023    To Whom It May Concern:    Domingo Finney  was at [facility] on 5/22/23. The patient may return to work/school on 5/24/23 with no restrictions. If you have any questions or concerns, or if I can be of further assistance, please do not hesitate to contact me.    Sincerely,    Lidia Long MD

## 2023-05-30 ENCOUNTER — HOSPITAL ENCOUNTER (EMERGENCY)
Facility: HOSPITAL | Age: 43
Discharge: ANOTHER HOSPITAL | End: 2023-05-30
Payer: COMMERCIAL

## 2023-05-30 ENCOUNTER — OFFICE VISIT (OUTPATIENT)
Dept: PRIMARY CARE CLINIC | Facility: CLINIC | Age: 43
End: 2023-05-30
Payer: COMMERCIAL

## 2023-05-30 VITALS
RESPIRATION RATE: 16 BRPM | HEART RATE: 87 BPM | OXYGEN SATURATION: 100 % | SYSTOLIC BLOOD PRESSURE: 187 MMHG | BODY MASS INDEX: 27.55 KG/M2 | TEMPERATURE: 99 F | DIASTOLIC BLOOD PRESSURE: 97 MMHG | WEIGHT: 186 LBS | HEIGHT: 69 IN

## 2023-05-30 VITALS
TEMPERATURE: 99 F | WEIGHT: 187 LBS | OXYGEN SATURATION: 100 % | BODY MASS INDEX: 27.7 KG/M2 | RESPIRATION RATE: 18 BRPM | HEIGHT: 69 IN | DIASTOLIC BLOOD PRESSURE: 104 MMHG | SYSTOLIC BLOOD PRESSURE: 180 MMHG | HEART RATE: 90 BPM

## 2023-05-30 DIAGNOSIS — I10 HYPERTENSION: ICD-10-CM

## 2023-05-30 DIAGNOSIS — I10 UNCONTROLLED HYPERTENSION: Primary | ICD-10-CM

## 2023-05-30 DIAGNOSIS — B20 HIV INFECTION, UNSPECIFIED SYMPTOM STATUS: ICD-10-CM

## 2023-05-30 DIAGNOSIS — E87.1 HYPONATREMIA: ICD-10-CM

## 2023-05-30 DIAGNOSIS — R47.81 SLURRED SPEECH: ICD-10-CM

## 2023-05-30 DIAGNOSIS — D72.819 LEUKOPENIA, UNSPECIFIED TYPE: ICD-10-CM

## 2023-05-30 DIAGNOSIS — R29.90 STROKE-LIKE SYMPTOMS: Primary | ICD-10-CM

## 2023-05-30 LAB
ALBUMIN SERPL BCP-MCNC: 3.8 G/DL (ref 3.5–5)
ALBUMIN/GLOB SERPL: 0.9 {RATIO}
ALP SERPL-CCNC: 80 U/L (ref 45–115)
ALT SERPL W P-5'-P-CCNC: 50 U/L (ref 16–61)
AMPHET UR QL SCN: NEGATIVE
ANION GAP SERPL CALCULATED.3IONS-SCNC: 11 MMOL/L (ref 7–16)
AST SERPL W P-5'-P-CCNC: 23 U/L (ref 15–37)
BARBITURATES UR QL SCN: NEGATIVE
BASOPHILS # BLD AUTO: 0 K/UL (ref 0–0.2)
BASOPHILS NFR BLD AUTO: 0 % (ref 0–1)
BENZODIAZ METAB UR QL SCN: NEGATIVE
BILIRUB SERPL-MCNC: 0.3 MG/DL (ref ?–1.2)
BILIRUB UR QL STRIP: NEGATIVE
BUN SERPL-MCNC: 10 MG/DL (ref 7–18)
BUN/CREAT SERPL: 10 (ref 6–20)
CALCIUM SERPL-MCNC: 8.7 MG/DL (ref 8.5–10.1)
CHLORIDE SERPL-SCNC: 99 MMOL/L (ref 98–107)
CLARITY UR: CLEAR
CO2 SERPL-SCNC: 26 MMOL/L (ref 21–32)
COCAINE UR QL SCN: NEGATIVE
COLOR UR: YELLOW
CREAT SERPL-MCNC: 1.03 MG/DL (ref 0.7–1.3)
DIFFERENTIAL METHOD BLD: ABNORMAL
EGFR (NO RACE VARIABLE) (RUSH/TITUS): 93 ML/MIN/1.73M2
EOSINOPHIL # BLD AUTO: 0.12 K/UL (ref 0–0.5)
EOSINOPHIL NFR BLD AUTO: 4.2 % (ref 1–4)
ERYTHROCYTE [DISTWIDTH] IN BLOOD BY AUTOMATED COUNT: 12.7 % (ref 11.5–14.5)
GLOBULIN SER-MCNC: 4.4 G/DL (ref 2–4)
GLUCOSE SERPL-MCNC: 102 MG/DL (ref 74–106)
GLUCOSE UR STRIP-MCNC: NEGATIVE MG/DL
HCT VFR BLD AUTO: 38.8 % (ref 40–54)
HGB BLD-MCNC: 13.1 G/DL (ref 13.5–18)
KETONES UR STRIP-SCNC: NEGATIVE MG/DL
LEUKOCYTE ESTERASE UR QL STRIP: NEGATIVE
LYMPHOCYTES # BLD AUTO: 1.57 K/UL (ref 1–4.8)
LYMPHOCYTES NFR BLD AUTO: 55.5 % (ref 27–41)
MCH RBC QN AUTO: 31 PG (ref 27–31)
MCHC RBC AUTO-ENTMCNC: 33.8 G/DL (ref 32–36)
MCV RBC AUTO: 91.9 FL (ref 80–96)
MDA UR QL SCN: NEGATIVE
METHADONE UR QL SCN: NEGATIVE
METHAMPHET UR QL SCN: NEGATIVE
MONOCYTES # BLD AUTO: 0.33 K/UL (ref 0–0.8)
MONOCYTES NFR BLD AUTO: 11.7 % (ref 2–6)
MPC BLD CALC-MCNC: 11.2 FL (ref 9.4–12.4)
NEUTROPHILS # BLD AUTO: 0.81 K/UL (ref 1.8–7.7)
NEUTROPHILS NFR BLD AUTO: 28.6 % (ref 53–65)
NITRITE UR QL STRIP: NEGATIVE
OPIATES UR QL SCN: NEGATIVE
OXYCODONE UR QL SCN: NEGATIVE
PCP UR QL SCN: NEGATIVE
PH UR STRIP: 7 PH UNITS
PLATELET # BLD AUTO: 150 K/UL (ref 150–400)
POTASSIUM SERPL-SCNC: 3.9 MMOL/L (ref 3.5–5.1)
PROT SERPL-MCNC: 8.2 G/DL (ref 6.4–8.2)
PROT UR QL STRIP: NEGATIVE
RBC # BLD AUTO: 4.22 M/UL (ref 4.6–6.2)
RBC # UR STRIP: NEGATIVE /UL
SODIUM SERPL-SCNC: 132 MMOL/L (ref 136–145)
SP GR UR STRIP: 1.01
THC UR QL SCN: NEGATIVE
TRICYCLICS UR QL SCN: NEGATIVE
UROBILINOGEN UR STRIP-ACNC: 0.2 MG/DL
WBC # BLD AUTO: 2.83 K/UL (ref 4.5–11)

## 2023-05-30 PROCEDURE — 99214 OFFICE O/P EST MOD 30 MIN: CPT | Mod: ,,, | Performed by: STUDENT IN AN ORGANIZED HEALTH CARE EDUCATION/TRAINING PROGRAM

## 2023-05-30 PROCEDURE — 99285 EMERGENCY DEPT VISIT HI MDM: CPT | Mod: ,,, | Performed by: NURSE PRACTITIONER

## 2023-05-30 PROCEDURE — 93005 ELECTROCARDIOGRAM TRACING: CPT

## 2023-05-30 PROCEDURE — 96374 THER/PROPH/DIAG INJ IV PUSH: CPT

## 2023-05-30 PROCEDURE — 93010 EKG 12-LEAD: ICD-10-PCS | Mod: ,,, | Performed by: INTERNAL MEDICINE

## 2023-05-30 PROCEDURE — 3008F PR BODY MASS INDEX (BMI) DOCUMENTED: ICD-10-PCS | Mod: ,,, | Performed by: STUDENT IN AN ORGANIZED HEALTH CARE EDUCATION/TRAINING PROGRAM

## 2023-05-30 PROCEDURE — 80305 DRUG TEST PRSMV DIR OPT OBS: CPT | Performed by: NURSE PRACTITIONER

## 2023-05-30 PROCEDURE — 99285 PR EMERGENCY DEPT VISIT,LEVEL V: ICD-10-PCS | Mod: ,,, | Performed by: NURSE PRACTITIONER

## 2023-05-30 PROCEDURE — 3080F PR MOST RECENT DIASTOLIC BLOOD PRESSURE >= 90 MM HG: ICD-10-PCS | Mod: ,,, | Performed by: STUDENT IN AN ORGANIZED HEALTH CARE EDUCATION/TRAINING PROGRAM

## 2023-05-30 PROCEDURE — 3008F BODY MASS INDEX DOCD: CPT | Mod: ,,, | Performed by: STUDENT IN AN ORGANIZED HEALTH CARE EDUCATION/TRAINING PROGRAM

## 2023-05-30 PROCEDURE — 3080F DIAST BP >= 90 MM HG: CPT | Mod: ,,, | Performed by: STUDENT IN AN ORGANIZED HEALTH CARE EDUCATION/TRAINING PROGRAM

## 2023-05-30 PROCEDURE — 81003 URINALYSIS AUTO W/O SCOPE: CPT | Performed by: NURSE PRACTITIONER

## 2023-05-30 PROCEDURE — 80053 COMPREHEN METABOLIC PANEL: CPT | Performed by: NURSE PRACTITIONER

## 2023-05-30 PROCEDURE — 3077F SYST BP >= 140 MM HG: CPT | Mod: ,,, | Performed by: STUDENT IN AN ORGANIZED HEALTH CARE EDUCATION/TRAINING PROGRAM

## 2023-05-30 PROCEDURE — 4010F ACE/ARB THERAPY RXD/TAKEN: CPT | Mod: ,,, | Performed by: STUDENT IN AN ORGANIZED HEALTH CARE EDUCATION/TRAINING PROGRAM

## 2023-05-30 PROCEDURE — 93010 ELECTROCARDIOGRAM REPORT: CPT | Mod: ,,, | Performed by: INTERNAL MEDICINE

## 2023-05-30 PROCEDURE — 85025 COMPLETE CBC W/AUTO DIFF WBC: CPT | Performed by: NURSE PRACTITIONER

## 2023-05-30 PROCEDURE — 96376 TX/PRO/DX INJ SAME DRUG ADON: CPT

## 2023-05-30 PROCEDURE — 99214 PR OFFICE/OUTPT VISIT, EST, LEVL IV, 30-39 MIN: ICD-10-PCS | Mod: ,,, | Performed by: STUDENT IN AN ORGANIZED HEALTH CARE EDUCATION/TRAINING PROGRAM

## 2023-05-30 PROCEDURE — 99285 EMERGENCY DEPT VISIT HI MDM: CPT | Mod: 25

## 2023-05-30 PROCEDURE — 63600175 PHARM REV CODE 636 W HCPCS: Performed by: NURSE PRACTITIONER

## 2023-05-30 PROCEDURE — 3077F PR MOST RECENT SYSTOLIC BLOOD PRESSURE >= 140 MM HG: ICD-10-PCS | Mod: ,,, | Performed by: STUDENT IN AN ORGANIZED HEALTH CARE EDUCATION/TRAINING PROGRAM

## 2023-05-30 PROCEDURE — 4010F PR ACE/ARB THEARPY RXD/TAKEN: ICD-10-PCS | Mod: ,,, | Performed by: STUDENT IN AN ORGANIZED HEALTH CARE EDUCATION/TRAINING PROGRAM

## 2023-05-30 RX ORDER — HYDRALAZINE HYDROCHLORIDE 20 MG/ML
5 INJECTION INTRAMUSCULAR; INTRAVENOUS
Status: COMPLETED | OUTPATIENT
Start: 2023-05-30 | End: 2023-05-30

## 2023-05-30 RX ADMIN — HYDRALAZINE HYDROCHLORIDE 5 MG: 20 INJECTION INTRAMUSCULAR; INTRAVENOUS at 03:05

## 2023-05-30 RX ADMIN — HYDRALAZINE HYDROCHLORIDE 5 MG: 20 INJECTION INTRAMUSCULAR; INTRAVENOUS at 04:05

## 2023-05-30 NOTE — PROGRESS NOTES
Subjective:      Faustino Finney is a 42 y.o.male who presents to clinic for Extremity Weakness (And pain to right leg and states he fell x 2 .Soreness to right leg . Would like lab work.), soreness (To top of head since Thursday.), slurred speech (Off and on since Thursday. Went to work but sent home  on Thursday and Friday because of weakness to right leg.), and Cough (Thinks it could be from lisinopril.)    Patient presents to clinic with complaints of intermittent slurred speech, headaches, right leg weakness and 2 falls since last Thursday. He states he fell because it felt like his right leg just gave out on him. He has been taking his antihypertensives as prescribed and is unsure as to why his blood pressure is so elevated today. Denies blurry vision, chest pain, palpitations, dizziness, or numbness. He states he feels like his speech is slurred depending on the time of day/situation. Reportedly was sent home from work last week due to his symptoms. He states he did not got to the hospital for evaluation at the time because his family was in town and didn't feel like he needed to.     Of note, office has received medical leave paperwork for continuous medical leave due to right leg pain.       Review of Systems   Eyes:  Negative for blurred vision.   Respiratory:  Positive for cough. Negative for shortness of breath.    Cardiovascular:  Negative for chest pain and palpitations.   Neurological:  Positive for weakness. Negative for dizziness, tingling and sensory change.      Past Medical History:  has a past medical history of HIV infection, Hyperlipidemia, and Hypertension.   Past Surgical History:  has a past surgical history that includes Surgical removal of lymph node (Right).  Family History: family history includes Hypertension in his father and mother.  Social History:  reports that he has been smoking cigarettes. He started smoking about 30 years ago. He has been smoking an average of .5 packs per  day. He has never used smokeless tobacco. He reports current alcohol use. He reports that he does not use drugs.  Allergies: Review of patient's allergies indicates:  No Known Allergies    Objective:     Vitals:    05/30/23 1317   BP: (!) 206/115   Pulse: 90   Resp: 18   Temp: 98.6 °F (37 °C)     Physical Exam  Vitals reviewed.   Constitutional:       General: He is not in acute distress.     Appearance: Normal appearance. He is not ill-appearing.   HENT:      Head: Normocephalic and atraumatic.      Right Ear: External ear normal.      Left Ear: External ear normal.   Eyes:      General: No scleral icterus.        Right eye: No discharge.         Left eye: No discharge.      Conjunctiva/sclera: Conjunctivae normal.   Cardiovascular:      Rate and Rhythm: Normal rate and regular rhythm.      Pulses: Normal pulses.   Pulmonary:      Effort: Pulmonary effort is normal. No respiratory distress.      Breath sounds: Normal breath sounds. No wheezing.   Musculoskeletal:      Right lower leg: No edema.      Left lower leg: No edema.   Neurological:      General: No focal deficit present.      Mental Status: He is alert and oriented to person, place, and time.      Cranial Nerves: No dysarthria or facial asymmetry.      Comments: Right lower leg weakness to flexion/extension   Psychiatric:         Behavior: Behavior normal.        The ASCVD Risk score (Yanely DK, et al., 2019) failed to calculate for the following reasons:    The valid systolic blood pressure range is 90 to 200 mmHg    Cannot find a previous HDL lab    Cannot find a previous total cholesterol lab    Assessment/Plan:     1. Uncontrolled hypertension  - initial blood pressure 206/115 -> 180/104 on repeat  - with uncontrolled hypertension + patient complaints, advised pt to present to ER for further evaluation of symptoms/r/o CVA with CT head if ER provider feels is indicated on presentation  - report given to ER provider    2. Slurred speech        Return to  clinic as needed.     Lidia Long MD  Family Medicine  05/30/2023

## 2023-05-30 NOTE — ED TRIAGE NOTES
Pt presents to the ED via POV w/ c/o headache, lower extremity weakness, drolling that started a week ago and everything has resolved except headache. Pt presented to his primary provider and was sent to ER for hypertensive emergency.

## 2023-05-30 NOTE — ED PROVIDER NOTES
Encounter Date: 5/30/2023       History     Chief Complaint   Patient presents with    Headache    Extremity Weakness     Patient presents to ED from PCP office for hypertension, right sided weakness, drooling and repeated falls that started last Monday. States he woke Monday morning to go to the restroom and had weakness in his right leg resulting in a fall, also fell Thursday and Friday. States symptoms have been improving since then. BP elevated on arrival to ED at 206/115. No facial droop or weakness. Mild deficit to cold sensation to right arm. Complains of pain to right parietal area - denies hitting head when he fell. Complains of muscular pain to right medial leg, no swelling or injury, negative Jak's. Ambulatory with steady gait. Reports compliance with Lisinopril, last dose this AM.     Hx: HTN (on Lisinopril)  HLD (not on statin - states PCP d/c'ed)  HIV (not on antiviral, last f/u with ID 2018)    The history is provided by the patient (PCP).   Review of patient's allergies indicates:  No Known Allergies  Past Medical History:   Diagnosis Date    HIV infection     Hyperlipidemia     Hypertension      Past Surgical History:   Procedure Laterality Date    SURGICAL REMOVAL OF LYMPH NODE Right     Removed from right armpit     Family History   Problem Relation Age of Onset    Hypertension Mother     Hypertension Father      Social History     Tobacco Use    Smoking status: Every Day     Packs/day: 0.50     Types: Cigarettes     Start date: 1993    Smokeless tobacco: Never   Substance Use Topics    Alcohol use: Yes    Drug use: Never     Review of Systems   Constitutional:  Negative for chills, fatigue and fever.   Eyes:  Negative for photophobia.   Respiratory:  Negative for cough, shortness of breath and wheezing.    Gastrointestinal:  Negative for nausea and vomiting.   Neurological:  Positive for headaches. Negative for dizziness, facial asymmetry, light-headedness and numbness.    Psychiatric/Behavioral:  Negative for confusion.    All other systems reviewed and are negative.    Physical Exam     Initial Vitals [05/30/23 1457]   BP Pulse Resp Temp SpO2   (!) 212/117 85 16 99.1 °F (37.3 °C) 100 %      MAP       --         Physical Exam    Nursing note and vitals reviewed.  Constitutional: He appears well-developed and well-nourished.   HENT:   Head: Normocephalic and atraumatic.   Right Ear: External ear normal.   Left Ear: External ear normal.   Nose: Nose normal.   Mouth/Throat: Oropharynx is clear and moist.   Eyes: EOM are normal. Pupils are equal, round, and reactive to light.   Neck: Neck supple.   Normal range of motion.  Cardiovascular:  Normal rate, regular rhythm and normal heart sounds.           Pulmonary/Chest: Breath sounds normal.   Abdominal: Abdomen is soft. Bowel sounds are normal. There is no abdominal tenderness.   Musculoskeletal:         General: Normal range of motion.      Cervical back: Normal range of motion and neck supple.     Neurological: He is alert and oriented to person, place, and time. He has normal strength. GCS score is 15. GCS eye subscore is 4. GCS verbal subscore is 5. GCS motor subscore is 6.   Skin: Capillary refill takes less than 2 seconds.   Psychiatric: He has a normal mood and affect. His behavior is normal. Judgment and thought content normal.       Medical Screening Exam   See Full Note    ED Course   Procedures  Labs Reviewed   COMPREHENSIVE METABOLIC PANEL - Abnormal; Notable for the following components:       Result Value    Sodium 132 (*)     Globulin 4.4 (*)     All other components within normal limits   CBC WITH DIFFERENTIAL - Abnormal; Notable for the following components:    WBC 2.83 (*)     RBC 4.22 (*)     Hemoglobin 13.1 (*)     Hematocrit 38.8 (*)     Neutrophils % 28.6 (*)     Lymphocytes % 55.5 (*)     Neutrophils, Abs 0.81 (*)     Monocytes % 11.7 (*)     Eosinophils % 4.2 (*)     All other components within normal limits    DRUG SCREEN, URINE (BEAKER) - Normal   URINALYSIS, REFLEX TO URINE CULTURE   CBC W/ AUTO DIFFERENTIAL    Narrative:     The following orders were created for panel order CBC auto differential.  Procedure                               Abnormality         Status                     ---------                               -----------         ------                     CBC with Differential[[id number]]        Abnormal            Final result               Manual Differential[[id number]]                                                           Please view results for these tests on the individual orders.     EKG Readings: (Independently Interpreted)   Initial Reading: No STEMI. Rhythm: Normal Sinus Rhythm. Ectopy: No Ectopy. Conduction: Normal. ST Segments: Normal ST Segments. T Waves: Normal. Clinical Impression: Normal Sinus Rhythm     Imaging Results              CT Head Without Contrast (Final result)  Result time 05/30/23 14:47:16      Final result by Derrick Reddy II, MD (05/30/23 14:47:16)                   Impression:      No evidence of abnormality demonstrated.      Electronically signed by: Derrick Reddy  Date:    05/30/2023  Time:    14:47               Narrative:    EXAMINATION:  CT HEAD WITHOUT CONTRAST    CLINICAL HISTORY:  Neuro deficit, acute, stroke suspected;Headache, new or worsening, neuro deficit (Age 19-49y);    TECHNIQUE:  Axial CT imaging of the brain is performed without contrast with 3 mm increments.    CT dose reduction technique used - Dose Rite and tube current modulation.    COMPARISON:  None available    FINDINGS:  No evidence of hemorrhage, mass, mass effect, midline shift or acute infarct seen.  The brain parenchyma attenuation and differentiation appears within normal limits. The ventricles and cisterns are normal in caliber.  No cranial or skull base abnormality is identified.                                       Medications   hydrALAZINE injection 5 mg (5 mg Intravenous Given  5/30/23 1514)   hydrALAZINE injection 5 mg (5 mg Intravenous Given 5/30/23 1614)     Medical Decision Making:   History:   Old Records Summarized: records from another hospital.       <> Summary of Records: Presented with stroke like symptoms in 2020, did not follow up with neurology. Discharged on Lipitor and ASA, not currently taking.     MRI Brain - Result Date: 2/15/2020  IMPRESSION: No large vessel acute infarct. Focus of abnormal signal in the posterior left putamen adjacent to the descending limb of the corticospinal tract. Signal characteristics are not suggestive of acute infarct. This could represent a subacute infarct however there is also associated intrinsic hyperintense T1 signal in this region limiting evaluation of whether this truly enhances. This could represent a punctate subacute lacunar infarct although with history of HIV an infectious process is difficult to entirely exclude. Continued follow-up recommended. Additional more chronic appearing signal changes in body of corpus callosum, right caudate head and left thalamus may represent sequela of prior infarcts and/or sequela of chronic chronic opportunistic infections in this patient with HIV.   Initial Assessment:   Patient presents to ED from PCP office for hypertension, right sided weakness, drooling and repeated falls that started last Monday. States he woke Monday morning to go to the restroom and had weakness in his right leg resulting in a fall, also fell Thursday and Friday. States symptoms have been improving since then. BP elevated on arrival to ED at 206/115. No facial droop or weakness. Mild deficit to cold sensation to right arm. Complains of pain to right parietal area - denies hitting head when he fell. Complains of muscular pain to right medial leg, no swelling or injury, negative Jak's. Ambulatory with steady gait. Reports compliance with Lisinopril, last dose this AM.     Hx: HTN (on Lisinopril)  HLD (not on statin)  HIV  (not on antiviral, last f/u with ID 2018)      Differential Diagnosis:   Stroke  TIA  Infectious process from untreated HIV  Clinical Tests:   Lab Tests: Ordered and Reviewed       <> Summary of Lab: WBC: 2.83   Neutrophils Relative: 28.6   Neutrophils, Abs: 0.81   Lymph %: 55.5   Mono %: 11.7       Radiological Study: Ordered and Reviewed  Medical Tests: Ordered and Reviewed  ED Management:  CTH with no sign of infarct  EKG NSR at 86 bpm  NIH: 1 (right arm deficit to cold sensation)  Hydralazine given for HTN  Telemed consult requested - patient needs MRI d/t HIV status - inpatient or outpatient?  Discussed patient with Dr. Egan at [facility]. Transfer to ED for MRI, ok to go POV with . Patient and  aware that patient could be discharged from ED if normal MRI. [facility] will set him up with outpatient neurology and ID. Patient advised to remain NPO until after MRI.   Discussed all with patient and spouse at bedside. Agreeable with plan of care and disposition.         Other:   I have discussed this case with another health care provider.       <> Summary of the Discussion: Discussed with Dr. Kwong at [facility], agrees patient needs MRI immediately, no bed availability at [facility].   Discussed patient with Dr. Egan, neurology, at [facility]. Agreeable to ED to ED transfer for MRI  Patient accepted by Dr. Quinonez at [facility].     Additional MDM:   Smoking Cessation: The patient is a smoker. The patient was counseled on smoking cessation for: 5 minutes. The patient was counseled on tobacco related  health complications. The patient refused a prescription for nicotine patches.     NIH Stroke Scale:   Interval = baseline (upon arrival/admit)  Level of consciousness = 0 - alert  LOC questions = 0 - answers both correctly  LOC commands = 0 - performs both correctly  Best gaze = 0 - normal  Visual = 0 - no visual loss  Facial palsy = 0 - normal  Motor left arm =  0 - no drift  Motor right arm =  0 -  no drift  Motor left leg = 0 - no drift  Motor right leg =  0 - no drift  Limb ataxia = 0 - absent  Sensory = 1 - mild to moderate loss  Best language = 0 - no aphasia  Dysarthria = 0 - normal articulation  Extinction and inattention = 0 - no neglect  NIH Stroke Scale Total = 1        ED Course as of 05/30/23 1738   Tue May 30, 2023   1516 WBC(!): 2.83 [MJ]   1516 Neutrophils Relative(!): 28.6 [MJ]   1516 Neutrophils, Abs(!): 0.81 [MJ]   1516 Lymph %(!): 55.5 [MJ]   1516 Mono %(!): 11.7 [MJ]   1530 Sodium(!): 132 [MJ]   1658 Telemed consult with Dr. Kwong, [facility].  [MJ]      ED Course User Index  [MJ] CATHERINE Gore                Clinical Impression:   Final diagnoses:  [I10] Hypertension - uncontrolled on current medication  [R29.90] Stroke-like symptoms (Primary)  [D72.819] Leukopenia, unspecified type  [E87.1] Hyponatremia  [B20] HIV infection, unspecified symptom status - not on antiviral therapy               CATHERINE Gore  05/30/23 1737

## 2023-05-30 NOTE — ED NOTES
42 year old male transferred to [facility] via POV to the services of Dr. Quinonez for a MRI of the brain. Pt is in no acute distress, denies any pain, shortness of pain, chest pain, headache, or n/v.  Pt advised to go straight to [facility] ER. Pt instructed nothing to eat or drink. Pt and spouse verbalized understanding.

## 2023-06-13 ENCOUNTER — OFFICE VISIT (OUTPATIENT)
Dept: PRIMARY CARE CLINIC | Facility: CLINIC | Age: 43
End: 2023-06-13
Payer: COMMERCIAL

## 2023-06-13 VITALS
SYSTOLIC BLOOD PRESSURE: 138 MMHG | BODY MASS INDEX: 27.99 KG/M2 | RESPIRATION RATE: 18 BRPM | OXYGEN SATURATION: 100 % | HEIGHT: 69 IN | WEIGHT: 189 LBS | TEMPERATURE: 98 F | DIASTOLIC BLOOD PRESSURE: 80 MMHG | HEART RATE: 85 BPM

## 2023-06-13 DIAGNOSIS — Z09 HOSPITAL DISCHARGE FOLLOW-UP: Primary | ICD-10-CM

## 2023-06-13 DIAGNOSIS — J30.2 SEASONAL ALLERGIES: ICD-10-CM
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
## 2023-06-13 PROCEDURE — 1159F PR MEDICATION LIST DOCUMENTED IN MEDICAL RECORD: ICD-10-PCS | Mod: ,,, | Performed by: STUDENT IN AN ORGANIZED HEALTH CARE EDUCATION/TRAINING PROGRAM

## 2023-06-13 PROCEDURE — 1160F PR REVIEW ALL MEDS BY PRESCRIBER/CLIN PHARMACIST DOCUMENTED: ICD-10-PCS | Mod: ,,, | Performed by: STUDENT IN AN ORGANIZED HEALTH CARE EDUCATION/TRAINING PROGRAM

## 2023-06-13 PROCEDURE — 99213 PR OFFICE/OUTPT VISIT, EST, LEVL III, 20-29 MIN: ICD-10-PCS | Mod: ,,, | Performed by: STUDENT IN AN ORGANIZED HEALTH CARE EDUCATION/TRAINING PROGRAM

## 2023-06-13 PROCEDURE — 4010F ACE/ARB THERAPY RXD/TAKEN: CPT | Mod: ,,, | Performed by: STUDENT IN AN ORGANIZED HEALTH CARE EDUCATION/TRAINING PROGRAM

## 2023-06-13 PROCEDURE — 99213 OFFICE O/P EST LOW 20 MIN: CPT | Mod: ,,, | Performed by: STUDENT IN AN ORGANIZED HEALTH CARE EDUCATION/TRAINING PROGRAM

## 2023-06-13 PROCEDURE — 4010F PR ACE/ARB THEARPY RXD/TAKEN: ICD-10-PCS | Mod: ,,, | Performed by: STUDENT IN AN ORGANIZED HEALTH CARE EDUCATION/TRAINING PROGRAM

## 2023-06-13 PROCEDURE — 3008F BODY MASS INDEX DOCD: CPT | Mod: ,,, | Performed by: STUDENT IN AN ORGANIZED HEALTH CARE EDUCATION/TRAINING PROGRAM

## 2023-06-13 PROCEDURE — 3008F PR BODY MASS INDEX (BMI) DOCUMENTED: ICD-10-PCS | Mod: ,,, | Performed by: STUDENT IN AN ORGANIZED HEALTH CARE EDUCATION/TRAINING PROGRAM

## 2023-06-13 PROCEDURE — 1160F RVW MEDS BY RX/DR IN RCRD: CPT | Mod: ,,, | Performed by: STUDENT IN AN ORGANIZED HEALTH CARE EDUCATION/TRAINING PROGRAM

## 2023-06-13 PROCEDURE — 1159F MED LIST DOCD IN RCRD: CPT | Mod: ,,, | Performed by: STUDENT IN AN ORGANIZED HEALTH CARE EDUCATION/TRAINING PROGRAM

## 2023-06-13 PROCEDURE — 3079F DIAST BP 80-89 MM HG: CPT | Mod: ,,, | Performed by: STUDENT IN AN ORGANIZED HEALTH CARE EDUCATION/TRAINING PROGRAM

## 2023-06-13 PROCEDURE — 3075F SYST BP GE 130 - 139MM HG: CPT | Mod: ,,, | Performed by: STUDENT IN AN ORGANIZED HEALTH CARE EDUCATION/TRAINING PROGRAM

## 2023-06-13 PROCEDURE — 3075F PR MOST RECENT SYSTOLIC BLOOD PRESS GE 130-139MM HG: ICD-10-PCS | Mod: ,,, | Performed by: STUDENT IN AN ORGANIZED HEALTH CARE EDUCATION/TRAINING PROGRAM

## 2023-06-13 PROCEDURE — 3079F PR MOST RECENT DIASTOLIC BLOOD PRESSURE 80-89 MM HG: ICD-10-PCS | Mod: ,,, | Performed by: STUDENT IN AN ORGANIZED HEALTH CARE EDUCATION/TRAINING PROGRAM

## 2023-06-13 RX ORDER — NAPROXEN SODIUM 220 MG/1
81 TABLET, FILM COATED ORAL DAILY
Qty: 30 TABLET | Refills: 11 | Status: SHIPPED | OUTPATIENT
Start: 2023-06-13

## 2023-06-13 RX ORDER — AMLODIPINE BESYLATE 10 MG/1
10 TABLET ORAL EVERY MORNING
COMMUNITY
Start: 2023-06-01 | End: 2023-06-13 | Stop reason: SDUPTHER

## 2023-06-13 RX ORDER — AMLODIPINE BESYLATE 10 MG/1
10 TABLET ORAL EVERY MORNING
Qty: 90 TABLET | Refills: 1 | Status: SHIPPED | OUTPATIENT
Start: 2023-06-13 | End: 2024-01-18 | Stop reason: SDUPTHER

## 2023-06-13 RX ORDER — NAPROXEN SODIUM 220 MG/1
TABLET, FILM COATED ORAL
COMMUNITY
Start: 2023-06-01 | End: 2023-06-13 | Stop reason: SDUPTHER

## 2023-06-13 RX ORDER — ATORVASTATIN CALCIUM 40 MG/1
40 TABLET, FILM COATED ORAL NIGHTLY
Qty: 90 TABLET | Refills: 3 | Status: SHIPPED | OUTPATIENT
Start: 2023-06-13

## 2023-06-13 RX ORDER — ATORVASTATIN CALCIUM 40 MG/1
40 TABLET, FILM COATED ORAL NIGHTLY
COMMUNITY
Start: 2023-06-01 | End: 2023-06-13 | Stop reason: SDUPTHER

## 2023-06-13 NOTE — PROGRESS NOTES
Subjective:      Faustino Finney is a 42 y.o.male who presents to clinic for Follow-up    Per chart review, patient was evaluated at [facility] ER following our last office visit on 5/30/23. CT head imaging with concerning findings. His case was discussed via telemedicine with [facility] who recommended pt get transferred to get MRI of the brain due to his HIV status. He was discharged to go POV to [facility] to get this done.     Patient states he was hospitalized at [facility] from 5/30-6/1/23 and was diagnosed with a stroke. He was started on baby aspirin, lipitor 40mg daily and norvasc 10mg and lisinopril was discontinued. He has f/u with the stroke clinic on 6/27. Since discharge, he has been asymptomatic and feeling okay. He has been to PT a few times. He reports having an MRI done there, but doesn't know what it showed. Denies residual weakness or symptoms.     Patient is also requesting medication for his allergies. He complains of watery eyes, sneezing, and rhinorrhea.     ROS     Past Medical History:  has a past medical history of HIV infection, Hyperlipidemia, and Hypertension.   Past Surgical History:  has a past surgical history that includes Surgical removal of lymph node (Right).  Family History: family history includes Hypertension in his father and mother.  Social History:  reports that he has been smoking cigarettes. He started smoking about 30 years ago. He has been smoking an average of .5 packs per day. He has never used smokeless tobacco. He reports current alcohol use. He reports that he does not use drugs.  Allergies: Review of patient's allergies indicates:  No Known Allergies    Objective:     Vitals:    06/13/23 0959   BP: 138/80   Pulse:    Resp:    Temp:      Physical Exam  Vitals reviewed. Exam conducted with a chaperone present.   Constitutional:       General: He is not in acute distress.     Appearance: Normal appearance. He is not ill-appearing, toxic-appearing or diaphoretic.   HENT:       Head: Normocephalic and atraumatic.      Right Ear: External ear normal.      Left Ear: External ear normal.   Eyes:      General: No scleral icterus.        Right eye: No discharge.         Left eye: No discharge.      Conjunctiva/sclera: Conjunctivae normal.   Cardiovascular:      Rate and Rhythm: Normal rate and regular rhythm.      Pulses: Normal pulses.   Pulmonary:      Effort: Pulmonary effort is normal. No respiratory distress.      Breath sounds: Normal breath sounds. No wheezing, rhonchi or rales.   Musculoskeletal:      Right lower leg: No edema.      Left lower leg: No edema.   Skin:     General: Skin is warm.   Neurological:      General: No focal deficit present.      Mental Status: He is alert.   Psychiatric:         Behavior: Behavior normal.          Assessment/Plan:     1. Hospital discharge follow-up  - record release form to [facility] signed for hospital discharge summary and MRI results  - continue current regimen as below  - encouraged to keep f/u appointment with Neuro  - amLODIPine (NORVASC) 10 MG tablet; Take 1 tablet (10 mg total) by mouth every morning.  Dispense: 90 tablet; Refill: 1  - aspirin 81 MG Chew; Take 1 tablet (81 mg total) by mouth once daily.  Dispense: 30 tablet; Refill: 11  - atorvastatin (LIPITOR) 40 MG tablet; Take 1 tablet (40 mg total) by mouth every evening.  Dispense: 90 tablet; Refill: 3    Declined health maintenance at this time.   Health Maintenance Due   Topic Date Due    Hepatitis C Screening  Never done    Pneumococcal Vaccines (Age 0-64) (1 - PCV) Never done    TETANUS VACCINE  Never done    COVID-19 Vaccine (3 - Pfizer series) 11/26/2021    Hemoglobin A1c (Diabetic Prevention Screening)  02/14/2023         Return to clinic in 3 months for f/u HTN or sooner if needed.     Lidia Long MD  Family Medicine  06/13/2023

## 2023-06-14 RX ORDER — CETIRIZINE HYDROCHLORIDE 10 MG/1
10 TABLET ORAL DAILY
Qty: 30 TABLET | Refills: 2 | Status: SHIPPED | OUTPATIENT
Start: 2023-06-14 | End: 2024-01-18 | Stop reason: SDUPTHER

## 2023-07-05 ENCOUNTER — OFFICE VISIT (OUTPATIENT)
Dept: PRIMARY CARE CLINIC | Facility: CLINIC | Age: 43
End: 2023-07-05
Payer: COMMERCIAL

## 2023-07-05 VITALS
TEMPERATURE: 99 F | WEIGHT: 188.81 LBS | HEART RATE: 88 BPM | DIASTOLIC BLOOD PRESSURE: 84 MMHG | SYSTOLIC BLOOD PRESSURE: 127 MMHG | BODY MASS INDEX: 27.88 KG/M2

## 2023-07-05 DIAGNOSIS — Z86.73 HISTORY OF CVA (CEREBROVASCULAR ACCIDENT): ICD-10-CM

## 2023-07-05 DIAGNOSIS — Z02.89 ENCOUNTER FOR PHYSICAL EXAMINATION RELATED TO EMPLOYMENT: Primary | ICD-10-CM

## 2023-07-05 DIAGNOSIS — B20 HIV INFECTION, UNSPECIFIED SYMPTOM STATUS: ICD-10-CM

## 2023-07-05 PROCEDURE — 1159F MED LIST DOCD IN RCRD: CPT | Mod: ,,, | Performed by: NURSE PRACTITIONER

## 2023-07-05 PROCEDURE — 1159F PR MEDICATION LIST DOCUMENTED IN MEDICAL RECORD: ICD-10-PCS | Mod: ,,, | Performed by: NURSE PRACTITIONER

## 2023-07-05 PROCEDURE — 3079F PR MOST RECENT DIASTOLIC BLOOD PRESSURE 80-89 MM HG: ICD-10-PCS | Mod: ,,, | Performed by: NURSE PRACTITIONER

## 2023-07-05 PROCEDURE — 4010F ACE/ARB THERAPY RXD/TAKEN: CPT | Mod: ,,, | Performed by: NURSE PRACTITIONER

## 2023-07-05 PROCEDURE — 99212 PR OFFICE/OUTPT VISIT, EST, LEVL II, 10-19 MIN: ICD-10-PCS | Mod: ,,, | Performed by: NURSE PRACTITIONER

## 2023-07-05 PROCEDURE — 3074F SYST BP LT 130 MM HG: CPT | Mod: ,,, | Performed by: NURSE PRACTITIONER

## 2023-07-05 PROCEDURE — 4010F PR ACE/ARB THEARPY RXD/TAKEN: ICD-10-PCS | Mod: ,,, | Performed by: NURSE PRACTITIONER

## 2023-07-05 PROCEDURE — 3074F PR MOST RECENT SYSTOLIC BLOOD PRESSURE < 130 MM HG: ICD-10-PCS | Mod: ,,, | Performed by: NURSE PRACTITIONER

## 2023-07-05 PROCEDURE — 99212 OFFICE O/P EST SF 10 MIN: CPT | Mod: ,,, | Performed by: NURSE PRACTITIONER

## 2023-07-05 PROCEDURE — 3008F BODY MASS INDEX DOCD: CPT | Mod: ,,, | Performed by: NURSE PRACTITIONER

## 2023-07-05 PROCEDURE — 3079F DIAST BP 80-89 MM HG: CPT | Mod: ,,, | Performed by: NURSE PRACTITIONER

## 2023-07-05 PROCEDURE — 3008F PR BODY MASS INDEX (BMI) DOCUMENTED: ICD-10-PCS | Mod: ,,, | Performed by: NURSE PRACTITIONER

## 2023-07-05 NOTE — PROGRESS NOTES
NEW CLINIC NOTE    Faustino Finney is a 42 y.o. Black or  male     Chief Complaint   Patient presents with    Letter for School/Work        SUBJECTIVE  Pt presents to clinic today for release to work. Since CVA in May 2023, he has been out of work. Per pt, he has been cleared by Neuro and is currently under PT management at [facility] in [place], [place]     Reports occasional L lower leg muscular pain but no ROM limitations.      Current Outpatient Medications on File Prior to Visit   Medication Sig Dispense Refill    amLODIPine (NORVASC) 10 MG tablet Take 1 tablet (10 mg total) by mouth every morning. 90 tablet 1    aspirin 81 MG Chew Take 1 tablet (81 mg total) by mouth once daily. 30 tablet 11    atorvastatin (LIPITOR) 40 MG tablet Take 1 tablet (40 mg total) by mouth every evening. 90 tablet 3    cetirizine (ZYRTEC) 10 MG tablet Take 1 tablet (10 mg total) by mouth once daily. 30 tablet 2     No current facility-administered medications on file prior to visit.      Review of patient's allergies indicates:  No Known Allergies     Past Medical History:   Diagnosis Date    HIV infection     Hyperlipidemia     Hypertension       Past Surgical History:   Procedure Laterality Date    SURGICAL REMOVAL OF LYMPH NODE Right     Removed from right armpit     Family History   Problem Relation Age of Onset    Hypertension Mother     Hypertension Father      Social History     Socioeconomic History    Marital status:     Number of children: 0   Tobacco Use    Smoking status: Every Day     Packs/day: 0.50     Types: Cigarettes     Start date: 1993    Smokeless tobacco: Never   Substance and Sexual Activity    Alcohol use: Yes    Drug use: Never    Sexual activity: Yes     Partners: Female        Lab Results   Component Value Date    WBC 2.83 (L) 05/30/2023    HGB 13.1 (L) 05/30/2023    HCT 38.8 (L) 05/30/2023    MCV 91.9 05/30/2023     05/30/2023        CMP  Sodium   Date Value Ref Range Status    05/30/2023 132 (L) 136 - 145 mmol/L Final     Potassium   Date Value Ref Range Status   05/30/2023 3.9 3.5 - 5.1 mmol/L Final     Chloride   Date Value Ref Range Status   05/30/2023 99 98 - 107 mmol/L Final     CO2   Date Value Ref Range Status   05/30/2023 26 21 - 32 mmol/L Final     Glucose   Date Value Ref Range Status   05/30/2023 102 74 - 106 mg/dL Final     BUN   Date Value Ref Range Status   05/30/2023 10 7 - 18 mg/dL Final     Creatinine   Date Value Ref Range Status   05/30/2023 1.03 0.70 - 1.30 mg/dL Final     Calcium   Date Value Ref Range Status   05/30/2023 8.7 8.5 - 10.1 mg/dL Final     Total Protein   Date Value Ref Range Status   05/30/2023 8.2 6.4 - 8.2 g/dL Final     Albumin   Date Value Ref Range Status   05/30/2023 3.8 3.5 - 5.0 g/dL Final     Bilirubin, Total   Date Value Ref Range Status   05/30/2023 0.3 >0.0 - 1.2 mg/dL Final     Alk Phos   Date Value Ref Range Status   05/30/2023 80 45 - 115 U/L Final     AST   Date Value Ref Range Status   05/30/2023 23 15 - 37 U/L Final     ALT   Date Value Ref Range Status   05/30/2023 50 16 - 61 U/L Final     Anion Gap   Date Value Ref Range Status   05/30/2023 11 7 - 16 mmol/L Final     eGFR    Date Value Ref Range Status   08/20/2020 114       Comment:     The above 20 analytes were performed by [facility] [id number] [place], [place] [id number]     eGFR   Date Value Ref Range Status   08/20/2020 94       Lab Results   Component Value Date    HGBA1C 5.3 05/30/2023         OBJECTIVE  /84 (BP Location: Left arm, Patient Position: Sitting, BP Method: Medium (Automatic))   Pulse 88   Temp 98.8 °F (37.1 °C) (Oral)   Wt 85.6 kg (188 lb 12.8 oz)   BMI 27.88 kg/m²      Physical Exam  Vitals and nursing note reviewed.   Constitutional:       Appearance: Normal appearance. He is normal weight.   HENT:      Mouth/Throat:      Mouth: Mucous membranes are moist.   Cardiovascular:      Rate and Rhythm: Normal rate and regular rhythm.       Pulses: Normal pulses.      Heart sounds: Normal heart sounds.   Pulmonary:      Effort: Pulmonary effort is normal.      Breath sounds: Normal breath sounds.   Musculoskeletal:         General: Normal range of motion.      Cervical back: Normal range of motion.   Skin:     General: Skin is warm.      Capillary Refill: Capillary refill takes less than 2 seconds.   Neurological:      Mental Status: He is alert. Mental status is at baseline.      Sensory: No sensory deficit.      Gait: Gait normal.   Psychiatric:         Behavior: Behavior normal.          ASSESSMENT & PLAN  1. Encounter for physical examination related to employment    2. History of CVA (cerebrovascular accident)    3. HIV infection, unspecified symptom status         Problem List Items Addressed This Visit          Neuro    History of CVA (cerebrovascular accident)       ID    HIV infection       Other    Encounter for physical examination related to employment - Primary   Released to work with continuation of PT.     RTC in 3 months for routine f/u  Will request JASON Hidalgo MD notes.    No follow-ups on file.

## 2023-07-19 ENCOUNTER — OFFICE VISIT (OUTPATIENT)
Dept: PRIMARY CARE CLINIC | Facility: CLINIC | Age: 43
End: 2023-07-19
Payer: COMMERCIAL

## 2023-07-19 VITALS
RESPIRATION RATE: 18 BRPM | OXYGEN SATURATION: 100 % | TEMPERATURE: 98 F | WEIGHT: 189 LBS | BODY MASS INDEX: 27.99 KG/M2 | HEART RATE: 88 BPM | HEIGHT: 69 IN | DIASTOLIC BLOOD PRESSURE: 80 MMHG | SYSTOLIC BLOOD PRESSURE: 134 MMHG

## 2023-07-19 DIAGNOSIS — Z86.73 HISTORY OF CVA (CEREBROVASCULAR ACCIDENT): ICD-10-CM

## 2023-07-19 DIAGNOSIS — M79.662 PAIN OF LEFT LOWER LEG: Primary | ICD-10-CM

## 2023-07-19 PROBLEM — Z02.89 ENCOUNTER FOR PHYSICAL EXAMINATION RELATED TO EMPLOYMENT: Status: RESOLVED | Noted: 2023-07-05 | Resolved: 2023-07-19

## 2023-07-19 PROCEDURE — 3008F BODY MASS INDEX DOCD: CPT | Mod: ,,, | Performed by: STUDENT IN AN ORGANIZED HEALTH CARE EDUCATION/TRAINING PROGRAM

## 2023-07-19 PROCEDURE — 3079F PR MOST RECENT DIASTOLIC BLOOD PRESSURE 80-89 MM HG: ICD-10-PCS | Mod: ,,, | Performed by: STUDENT IN AN ORGANIZED HEALTH CARE EDUCATION/TRAINING PROGRAM

## 2023-07-19 PROCEDURE — 99213 PR OFFICE/OUTPT VISIT, EST, LEVL III, 20-29 MIN: ICD-10-PCS | Mod: ,,, | Performed by: STUDENT IN AN ORGANIZED HEALTH CARE EDUCATION/TRAINING PROGRAM

## 2023-07-19 PROCEDURE — 3075F SYST BP GE 130 - 139MM HG: CPT | Mod: ,,, | Performed by: STUDENT IN AN ORGANIZED HEALTH CARE EDUCATION/TRAINING PROGRAM

## 2023-07-19 PROCEDURE — 4010F ACE/ARB THERAPY RXD/TAKEN: CPT | Mod: ,,, | Performed by: STUDENT IN AN ORGANIZED HEALTH CARE EDUCATION/TRAINING PROGRAM

## 2023-07-19 PROCEDURE — 3008F PR BODY MASS INDEX (BMI) DOCUMENTED: ICD-10-PCS | Mod: ,,, | Performed by: STUDENT IN AN ORGANIZED HEALTH CARE EDUCATION/TRAINING PROGRAM

## 2023-07-19 PROCEDURE — 1159F PR MEDICATION LIST DOCUMENTED IN MEDICAL RECORD: ICD-10-PCS | Mod: ,,, | Performed by: STUDENT IN AN ORGANIZED HEALTH CARE EDUCATION/TRAINING PROGRAM

## 2023-07-19 PROCEDURE — 4010F PR ACE/ARB THEARPY RXD/TAKEN: ICD-10-PCS | Mod: ,,, | Performed by: STUDENT IN AN ORGANIZED HEALTH CARE EDUCATION/TRAINING PROGRAM

## 2023-07-19 PROCEDURE — 3075F PR MOST RECENT SYSTOLIC BLOOD PRESS GE 130-139MM HG: ICD-10-PCS | Mod: ,,, | Performed by: STUDENT IN AN ORGANIZED HEALTH CARE EDUCATION/TRAINING PROGRAM

## 2023-07-19 PROCEDURE — 3079F DIAST BP 80-89 MM HG: CPT | Mod: ,,, | Performed by: STUDENT IN AN ORGANIZED HEALTH CARE EDUCATION/TRAINING PROGRAM

## 2023-07-19 PROCEDURE — 99213 OFFICE O/P EST LOW 20 MIN: CPT | Mod: ,,, | Performed by: STUDENT IN AN ORGANIZED HEALTH CARE EDUCATION/TRAINING PROGRAM

## 2023-07-19 PROCEDURE — 1159F MED LIST DOCD IN RCRD: CPT | Mod: ,,, | Performed by: STUDENT IN AN ORGANIZED HEALTH CARE EDUCATION/TRAINING PROGRAM

## 2023-07-19 RX ORDER — METHOCARBAMOL 500 MG/1
500 TABLET, FILM COATED ORAL 4 TIMES DAILY PRN
Qty: 40 TABLET | Refills: 0 | Status: SHIPPED | OUTPATIENT
Start: 2023-07-19 | End: 2023-07-29

## 2023-07-19 NOTE — PROGRESS NOTES
"Subjective:      Faustino Finney is a 42 y.o.male who presents to clinic for Leg Injury    Patient presents to clinic with complaints of worsening left lower extremity pain due to "his recent stroke." He states that his stroke affected his right side but he is having more pain in his right side because of compensation with his leg. He is attending PT and has been going for about 2 weeks now. He states he is unable to work due pain when standing for long periods of time and was sent home from [facility] due to this. Needs something written to them explaining his condition. He has not been taking anything for his pain. He says PT has been icing his leg down afterwards and that helps with his pain. Described as soreness. Denies recent travel or immobilization.       ROS     Past Medical History:  has a past medical history of HIV infection, Hyperlipidemia, and Hypertension.   Past Surgical History:  has a past surgical history that includes Surgical removal of lymph node (Right).  Family History: family history includes Hypertension in his father and mother.  Social History:  reports that he has been smoking cigarettes. He started smoking about 30 years ago. He has been smoking an average of .5 packs per day. He has never used smokeless tobacco. He reports current alcohol use. He reports that he does not use drugs.  Allergies: Review of patient's allergies indicates:  No Known Allergies    Objective:     Vitals:    07/19/23 0935   BP: 134/80   Pulse:    Resp:    Temp:      Physical Exam  Vitals reviewed.   Constitutional:       General: He is not in acute distress.     Appearance: Normal appearance. He is not ill-appearing.   HENT:      Head: Normocephalic and atraumatic.      Right Ear: External ear normal.      Left Ear: External ear normal.   Eyes:      General: No scleral icterus.        Right eye: No discharge.         Left eye: No discharge.      Conjunctiva/sclera: Conjunctivae normal.   Cardiovascular:      Rate " and Rhythm: Normal rate and regular rhythm.      Pulses: Normal pulses.   Pulmonary:      Effort: Pulmonary effort is normal. No respiratory distress.      Breath sounds: Normal breath sounds. No wheezing.   Musculoskeletal:      Right lower leg: No edema.      Left lower leg: No edema.      Comments: 5/5 muscle strength in all extremities  TTP of left lower extremity   Neurological:      General: No focal deficit present.      Mental Status: He is alert.   Psychiatric:         Behavior: Behavior normal.        The 10-year ASCVD risk score (Yanely DK, et al., 2019) is: 11.7%    Values used to calculate the score:      Age: 42 years      Sex: Male      Is Non- : Yes      Diabetic: No      Tobacco smoker: Yes      Systolic Blood Pressure: 134 mmHg      Is BP treated: Yes      HDL Cholesterol: 34 mg/dL      Total Cholesterol: 186 mg/dL    Assessment/Plan:     1. Pain of left lower leg  - pt declined ortho referral due to continued complaints of leg pain  - letter provided to patient  - methocarbamoL (ROBAXIN) 500 MG Tab; Take 1 tablet (500 mg total) by mouth 4 (four) times daily as needed (leg pain).  Dispense: 40 tablet; Refill: 0    2. History of CVA (cerebrovascular accident)       Had PCV20 with [facility] at his last visit with them  Health Maintenance Due   Topic Date Due    Hepatitis C Screening  Never done    Pneumococcal Vaccines (Age 0-64) (1 - PCV) Never done    TETANUS VACCINE  Never done    COVID-19 Vaccine (3 - Pfizer series) 11/26/2021         Return to clinic prn.     Lidia Long MD  Family Medicine  07/19/2023

## 2023-07-19 NOTE — LETTER
July 19, 2023      [facility]  [address] [facility] [address]  Phone: [phone]  Fax: [phone]       Patient: Faustino Finney   YOB: 1980  Date of Visit: 07/19/2023    To Whom It May Concern:      Domingo Finney  was at [facility] on 07/19/2023. The patient is requesting to have modified work activities due to continued left lower extremity pain when standing for long periods of time. The patient may return to work/school on 7/19/23. If you have any questions or concerns, or if I can be of further assistance, please do not hesitate to contact me.      Sincerely,    Lidia Long MD

## 2023-07-19 NOTE — LETTER
July 19, 2023      [facility]  [address] [facility] [address]  Phone: [phone]  Fax: [phone]       Patient: Faustino Finney   YOB: 1980  Date of Visit: 07/19/2023    To Whom It May Concern:    Domingo Finney  was at [facility] on 07/19/2023. The patient is requesting to have modified work activities due to continued left lower extremity pain when standing for long periods of time. The patient may return to work/school on 7/19/23 with no restrictions. If you have any questions or concerns, or if I can be of further assistance, please do not hesitate to contact me.      Sincerely,    Lidia Long MD      Tarsorrhaphy Performed?: No

## 2023-08-21 ENCOUNTER — OFFICE VISIT (OUTPATIENT)
Dept: PRIMARY CARE CLINIC | Facility: CLINIC | Age: 43
End: 2023-08-21
Payer: COMMERCIAL

## 2023-08-21 VITALS
DIASTOLIC BLOOD PRESSURE: 86 MMHG | SYSTOLIC BLOOD PRESSURE: 138 MMHG | HEART RATE: 100 BPM | WEIGHT: 183 LBS | OXYGEN SATURATION: 100 % | TEMPERATURE: 98 F | BODY MASS INDEX: 27.11 KG/M2 | HEIGHT: 69 IN | RESPIRATION RATE: 18 BRPM

## 2023-08-21 DIAGNOSIS — B35.3 TINEA PEDIS OF LEFT FOOT: Primary | ICD-10-CM

## 2023-08-21 PROCEDURE — 96372 THER/PROPH/DIAG INJ SC/IM: CPT | Mod: ICN,,, | Performed by: STUDENT IN AN ORGANIZED HEALTH CARE EDUCATION/TRAINING PROGRAM

## 2023-08-21 PROCEDURE — 96372 PR INJECTION,THERAP/PROPH/DIAG2ST, IM OR SUBCUT: ICD-10-PCS | Mod: ICN,,, | Performed by: STUDENT IN AN ORGANIZED HEALTH CARE EDUCATION/TRAINING PROGRAM

## 2023-08-21 PROCEDURE — 1159F PR MEDICATION LIST DOCUMENTED IN MEDICAL RECORD: ICD-10-PCS | Mod: ICN,,, | Performed by: STUDENT IN AN ORGANIZED HEALTH CARE EDUCATION/TRAINING PROGRAM

## 2023-08-21 PROCEDURE — 1159F MED LIST DOCD IN RCRD: CPT | Mod: ICN,,, | Performed by: STUDENT IN AN ORGANIZED HEALTH CARE EDUCATION/TRAINING PROGRAM

## 2023-08-21 PROCEDURE — 3075F SYST BP GE 130 - 139MM HG: CPT | Mod: ICN,,, | Performed by: STUDENT IN AN ORGANIZED HEALTH CARE EDUCATION/TRAINING PROGRAM

## 2023-08-21 PROCEDURE — 3079F PR MOST RECENT DIASTOLIC BLOOD PRESSURE 80-89 MM HG: ICD-10-PCS | Mod: ICN,,, | Performed by: STUDENT IN AN ORGANIZED HEALTH CARE EDUCATION/TRAINING PROGRAM

## 2023-08-21 PROCEDURE — 4010F PR ACE/ARB THEARPY RXD/TAKEN: ICD-10-PCS | Mod: ICN,,, | Performed by: STUDENT IN AN ORGANIZED HEALTH CARE EDUCATION/TRAINING PROGRAM

## 2023-08-21 PROCEDURE — 3008F BODY MASS INDEX DOCD: CPT | Mod: ICN,,, | Performed by: STUDENT IN AN ORGANIZED HEALTH CARE EDUCATION/TRAINING PROGRAM

## 2023-08-21 PROCEDURE — 3008F PR BODY MASS INDEX (BMI) DOCUMENTED: ICD-10-PCS | Mod: ICN,,, | Performed by: STUDENT IN AN ORGANIZED HEALTH CARE EDUCATION/TRAINING PROGRAM

## 2023-08-21 PROCEDURE — 99213 PR OFFICE/OUTPT VISIT, EST, LEVL III, 20-29 MIN: ICD-10-PCS | Mod: 25,ICN,, | Performed by: STUDENT IN AN ORGANIZED HEALTH CARE EDUCATION/TRAINING PROGRAM

## 2023-08-21 PROCEDURE — 3079F DIAST BP 80-89 MM HG: CPT | Mod: ICN,,, | Performed by: STUDENT IN AN ORGANIZED HEALTH CARE EDUCATION/TRAINING PROGRAM

## 2023-08-21 PROCEDURE — 99213 OFFICE O/P EST LOW 20 MIN: CPT | Mod: 25,ICN,, | Performed by: STUDENT IN AN ORGANIZED HEALTH CARE EDUCATION/TRAINING PROGRAM

## 2023-08-21 PROCEDURE — 3075F PR MOST RECENT SYSTOLIC BLOOD PRESS GE 130-139MM HG: ICD-10-PCS | Mod: ICN,,, | Performed by: STUDENT IN AN ORGANIZED HEALTH CARE EDUCATION/TRAINING PROGRAM

## 2023-08-21 PROCEDURE — 4010F ACE/ARB THERAPY RXD/TAKEN: CPT | Mod: ICN,,, | Performed by: STUDENT IN AN ORGANIZED HEALTH CARE EDUCATION/TRAINING PROGRAM

## 2023-08-21 RX ORDER — CLOTRIMAZOLE 1 %
CREAM (GRAM) TOPICAL 2 TIMES DAILY
Qty: 45 G | Refills: 0 | Status: SHIPPED | OUTPATIENT
Start: 2023-08-21 | End: 2023-09-20

## 2023-08-21 RX ORDER — DICLOFENAC SODIUM 50 MG/1
50 TABLET, DELAYED RELEASE ORAL 2 TIMES DAILY
Qty: 28 TABLET | Refills: 0 | Status: SHIPPED | OUTPATIENT
Start: 2023-08-21 | End: 2023-09-04

## 2023-08-21 RX ORDER — ABACAVIR SULFATE, DOLUTEGRAVIR SODIUM, LAMIVUDINE 600; 50; 300 MG/1; MG/1; MG/1
1 TABLET, FILM COATED ORAL
COMMUNITY
Start: 2023-07-21

## 2023-08-21 RX ORDER — KETOROLAC TROMETHAMINE 30 MG/ML
30 INJECTION, SOLUTION INTRAMUSCULAR; INTRAVENOUS
Status: COMPLETED | OUTPATIENT
Start: 2023-08-21 | End: 2023-08-21

## 2023-08-21 RX ADMIN — KETOROLAC TROMETHAMINE 30 MG: 30 INJECTION, SOLUTION INTRAMUSCULAR; INTRAVENOUS at 10:08

## 2023-08-21 NOTE — PROGRESS NOTES
Subjective:      Faustino Finney is a 43 y.o.male who presents to clinic for Toe Pain    Patient presents to clinic with complaints of left 4th toe pain since last Friday. He states he was at work last week when his feet got wet at work. He began having pain in the tips of his toes - thought to be due to a fungal infection. He bought lidocaine spray and it resolved the pain except for the 4th toe. Pain is worsened with lateral movement. Denies fevers or chills. He hasn't been applying anything to the toes. Denies neuropathic symptoms. He hasn't taken anything for his pain.     ROS     Past Medical History:  has a past medical history of HIV infection, Hyperlipidemia, and Hypertension.   Past Surgical History:  has a past surgical history that includes Surgical removal of lymph node (Right).  Family History: family history includes Hypertension in his father and mother.  Social History:  reports that he has been smoking cigarettes. He started smoking about 30 years ago. He has a 15.3 pack-year smoking history. He has never used smokeless tobacco. He reports current alcohol use. He reports that he does not use drugs.  Allergies: Review of patient's allergies indicates:  No Known Allergies    Objective:     Vitals:    08/21/23 1009   BP: 138/86   Pulse:    Resp:    Temp:      Physical Exam  Vitals reviewed.   Constitutional:       General: He is not in acute distress.     Appearance: Normal appearance. He is not ill-appearing, toxic-appearing or diaphoretic.   HENT:      Head: Normocephalic and atraumatic.   Eyes:      General: No scleral icterus.        Right eye: No discharge.         Left eye: No discharge.   Cardiovascular:      Pulses:           Dorsalis pedis pulses are 1+ on the right side.        Posterior tibial pulses are 1+ on the right side.   Pulmonary:      Effort: Pulmonary effort is normal. No respiratory distress.   Feet:      Right foot:      Skin integrity: Skin breakdown and dry skin present.       Toenail Condition: Right toenails are abnormally thick.      Comments: Wet and white appearing skin in between 5th and 4th digit on left toe  Neurological:      General: No focal deficit present.      Mental Status: He is alert.   Psychiatric:         Behavior: Behavior normal.          The 10-year ASCVD risk score (Yanely DK, et al., 2019) is: 13%    Values used to calculate the score:      Age: 43 years      Sex: Male      Is Non- : Yes      Diabetic: No      Tobacco smoker: Yes      Systolic Blood Pressure: 138 mmHg      Is BP treated: Yes      HDL Cholesterol: 34 mg/dL      Total Cholesterol: 186 mg/dL    Assessment/Plan:     1. Tinea pedis of left foot  - trial of below; if doesn't improve consider PO meds  - clotrimazole (LOTRIMIN) 1 % cream; Apply topically 2 (two) times daily.  Dispense: 45 g; Refill: 0  - ketorolac injection 30 mg  - diclofenac (VOLTAREN) 50 MG EC tablet; Take 1 tablet (50 mg total) by mouth 2 (two) times daily. for 14 days  Dispense: 28 tablet; Refill: 0      Return to clinic if symptoms persist or sooner if needed.     Lidia Long MD  Family Medicine  08/21/2023

## 2023-08-21 NOTE — LETTER
August 21, 2023      [address] [place] [address]  Phone: [phone]  Fax: [phone]       Patient: Faustino Finney   YOB: 1980  Date of Visit: 08/21/2023    To Whom It May Concern:    Domingo Finney  was at [facility] on 08/21/2023. The patient may return to work/school on 8/23/23 with no restrictions. If you have any questions or concerns, or if I can be of further assistance, please do not hesitate to contact me.      Sincerely,    Lidia Long MD

## 2023-12-19 ENCOUNTER — OFFICE VISIT (OUTPATIENT)
Dept: PRIMARY CARE CLINIC | Facility: CLINIC | Age: 43
End: 2023-12-19
Payer: COMMERCIAL

## 2023-12-19 VITALS
HEART RATE: 97 BPM | DIASTOLIC BLOOD PRESSURE: 86 MMHG | BODY MASS INDEX: 27.99 KG/M2 | OXYGEN SATURATION: 100 % | SYSTOLIC BLOOD PRESSURE: 146 MMHG | HEIGHT: 69 IN | WEIGHT: 189 LBS | TEMPERATURE: 99 F | RESPIRATION RATE: 18 BRPM

## 2023-12-19 DIAGNOSIS — I73.9 PVD (PERIPHERAL VASCULAR DISEASE): ICD-10-CM

## 2023-12-19 DIAGNOSIS — Z20.822 ENCOUNTER FOR LABORATORY TESTING FOR COVID-19 VIRUS: ICD-10-CM

## 2023-12-19 DIAGNOSIS — J06.9 UPPER RESPIRATORY TRACT INFECTION, UNSPECIFIED TYPE: Primary | ICD-10-CM

## 2023-12-19 DIAGNOSIS — R05.9 COUGH, UNSPECIFIED TYPE: ICD-10-CM

## 2023-12-19 LAB
CTP QC/QA: YES
CTP QC/QA: YES
FLUAV AG NPH QL: NEGATIVE
FLUBV AG NPH QL: NEGATIVE
SARS-COV-2 RDRP RESP QL NAA+PROBE: NEGATIVE

## 2023-12-19 PROCEDURE — 87635: ICD-10-PCS | Mod: ,,, | Performed by: NURSE PRACTITIONER

## 2023-12-19 PROCEDURE — 3008F PR BODY MASS INDEX (BMI) DOCUMENTED: ICD-10-PCS | Mod: ,,, | Performed by: NURSE PRACTITIONER

## 2023-12-19 PROCEDURE — 3077F PR MOST RECENT SYSTOLIC BLOOD PRESSURE >= 140 MM HG: ICD-10-PCS | Mod: ,,, | Performed by: NURSE PRACTITIONER

## 2023-12-19 PROCEDURE — 87804 POCT INFLUENZA A/B: ICD-10-PCS | Mod: 59,QW,, | Performed by: NURSE PRACTITIONER

## 2023-12-19 PROCEDURE — 4010F ACE/ARB THERAPY RXD/TAKEN: CPT | Mod: ,,, | Performed by: NURSE PRACTITIONER

## 2023-12-19 PROCEDURE — 3044F HG A1C LEVEL LT 7.0%: CPT | Mod: ,,, | Performed by: NURSE PRACTITIONER

## 2023-12-19 PROCEDURE — 96372 THER/PROPH/DIAG INJ SC/IM: CPT | Mod: ,,, | Performed by: NURSE PRACTITIONER

## 2023-12-19 PROCEDURE — 3079F DIAST BP 80-89 MM HG: CPT | Mod: ,,, | Performed by: NURSE PRACTITIONER

## 2023-12-19 PROCEDURE — 87635 SARS-COV-2 COVID-19 AMP PRB: CPT | Mod: ,,, | Performed by: NURSE PRACTITIONER

## 2023-12-19 PROCEDURE — 3044F PR MOST RECENT HEMOGLOBIN A1C LEVEL <7.0%: ICD-10-PCS | Mod: ,,, | Performed by: NURSE PRACTITIONER

## 2023-12-19 PROCEDURE — 1159F PR MEDICATION LIST DOCUMENTED IN MEDICAL RECORD: ICD-10-PCS | Mod: ,,, | Performed by: NURSE PRACTITIONER

## 2023-12-19 PROCEDURE — 3079F PR MOST RECENT DIASTOLIC BLOOD PRESSURE 80-89 MM HG: ICD-10-PCS | Mod: ,,, | Performed by: NURSE PRACTITIONER

## 2023-12-19 PROCEDURE — 99213 PR OFFICE/OUTPT VISIT, EST, LEVL III, 20-29 MIN: ICD-10-PCS | Mod: 25,,, | Performed by: NURSE PRACTITIONER

## 2023-12-19 PROCEDURE — 96372 PR INJECTION,THERAP/PROPH/DIAG2ST, IM OR SUBCUT: ICD-10-PCS | Mod: ,,, | Performed by: NURSE PRACTITIONER

## 2023-12-19 PROCEDURE — 3077F SYST BP >= 140 MM HG: CPT | Mod: ,,, | Performed by: NURSE PRACTITIONER

## 2023-12-19 PROCEDURE — 3008F BODY MASS INDEX DOCD: CPT | Mod: ,,, | Performed by: NURSE PRACTITIONER

## 2023-12-19 PROCEDURE — 1159F MED LIST DOCD IN RCRD: CPT | Mod: ,,, | Performed by: NURSE PRACTITIONER

## 2023-12-19 PROCEDURE — 99213 OFFICE O/P EST LOW 20 MIN: CPT | Mod: 25,,, | Performed by: NURSE PRACTITIONER

## 2023-12-19 PROCEDURE — 4010F PR ACE/ARB THEARPY RXD/TAKEN: ICD-10-PCS | Mod: ,,, | Performed by: NURSE PRACTITIONER

## 2023-12-19 PROCEDURE — 87804 INFLUENZA ASSAY W/OPTIC: CPT | Mod: 59,QW,, | Performed by: NURSE PRACTITIONER

## 2023-12-19 RX ORDER — CEFTRIAXONE 1 G/1
1 INJECTION, POWDER, FOR SOLUTION INTRAMUSCULAR; INTRAVENOUS
Status: COMPLETED | OUTPATIENT
Start: 2023-12-19 | End: 2023-12-19

## 2023-12-19 RX ORDER — DEXAMETHASONE SODIUM PHOSPHATE 4 MG/ML
4 INJECTION, SOLUTION INTRA-ARTICULAR; INTRALESIONAL; INTRAMUSCULAR; INTRAVENOUS; SOFT TISSUE
Status: COMPLETED | OUTPATIENT
Start: 2023-12-19 | End: 2023-12-19

## 2023-12-19 RX ADMIN — CEFTRIAXONE 1 G: 1 INJECTION, POWDER, FOR SOLUTION INTRAMUSCULAR; INTRAVENOUS at 10:12

## 2023-12-19 RX ADMIN — DEXAMETHASONE SODIUM PHOSPHATE 4 MG: 4 INJECTION, SOLUTION INTRA-ARTICULAR; INTRALESIONAL; INTRAMUSCULAR; INTRAVENOUS; SOFT TISSUE at 10:12

## 2023-12-19 NOTE — LETTER
December 19, 2023      [address] [place] [address]  Phone: [phone]  Fax: [phone]       Patient: Faustino Finney   YOB: 1980  Date of Visit: 12/19/2023    To Whom It May Concern:    Domingo Finney  was at [facility] on 12/19/2023. The patient may return to work/school on 12/20/2023 without restrictions. If you have any questions or concerns, or if I can be of further assistance, please do not hesitate to contact me.    Sincerely,    Helga Gray NP

## 2023-12-19 NOTE — PROGRESS NOTES
NEW CLINIC NOTE    Faustino Finney is a 43 y.o. Black or  male     Chief Complaint   Patient presents with    Nasal Congestion    Cough        SUBJECTIVE  Pt presents to clinic today with a one week history of chest congestion. Denies sore throat, headache, or pain. Denies fever or chills. Cough is productive of a white sputum.     **Pt was scheduled for an angiogram today but was told to see PCP for treatment. To reschedule that procedure.       Current Outpatient Medications on File Prior to Visit   Medication Sig Dispense Refill    abacavir-dolutegravir-lamivud (TRIUMEQ) 600- mg Tab Take 1 tablet by mouth.      amLODIPine (NORVASC) 10 MG tablet Take 1 tablet (10 mg total) by mouth every morning. 90 tablet 1    aspirin 81 MG Chew Take 1 tablet (81 mg total) by mouth once daily. 30 tablet 11    atorvastatin (LIPITOR) 40 MG tablet Take 1 tablet (40 mg total) by mouth every evening. 90 tablet 3    cetirizine (ZYRTEC) 10 MG tablet Take 1 tablet (10 mg total) by mouth once daily. 30 tablet 2    clotrimazole (LOTRIMIN) 1 % cream Apply topically 2 (two) times daily. 45 g 0     No current facility-administered medications on file prior to visit.      Review of patient's allergies indicates:  No Known Allergies     Past Medical History:   Diagnosis Date    HIV infection     Hyperlipidemia     Hypertension       Past Surgical History:   Procedure Laterality Date    SURGICAL REMOVAL OF LYMPH NODE Right     Removed from right armpit     Family History   Problem Relation Age of Onset    Hypertension Mother     Hypertension Father      Social History     Socioeconomic History    Marital status:     Number of children: 0   Tobacco Use    Smoking status: Every Day     Current packs/day: 0.50     Average packs/day: 0.5 packs/day for 31.0 years (15.5 ttl pk-yrs)     Types: Cigarettes     Start date: 1993    Smokeless tobacco: Never   Substance and Sexual Activity    Alcohol use: Yes    Drug use:  "Never    Sexual activity: Yes     Partners: Female        Lab Results   Component Value Date    WBC 2.83 (L) 05/30/2023    HGB 13.1 (L) 05/30/2023    HCT 38.8 (L) 05/30/2023    MCV 91.9 05/30/2023     05/30/2023        CMP  Sodium   Date Value Ref Range Status   05/30/2023 132 (L) 136 - 145 mmol/L Final     Potassium   Date Value Ref Range Status   05/30/2023 3.9 3.5 - 5.1 mmol/L Final     Chloride   Date Value Ref Range Status   05/30/2023 99 98 - 107 mmol/L Final     CO2   Date Value Ref Range Status   05/30/2023 26 21 - 32 mmol/L Final     Glucose   Date Value Ref Range Status   05/30/2023 102 74 - 106 mg/dL Final     BUN   Date Value Ref Range Status   05/30/2023 10 7 - 18 mg/dL Final     Creatinine   Date Value Ref Range Status   05/30/2023 1.03 0.70 - 1.30 mg/dL Final     Calcium   Date Value Ref Range Status   05/30/2023 8.7 8.5 - 10.1 mg/dL Final     Total Protein   Date Value Ref Range Status   05/30/2023 8.2 6.4 - 8.2 g/dL Final     Albumin   Date Value Ref Range Status   05/30/2023 3.8 3.5 - 5.0 g/dL Final     Bilirubin, Total   Date Value Ref Range Status   05/30/2023 0.3 >0.0 - 1.2 mg/dL Final     Alk Phos   Date Value Ref Range Status   05/30/2023 80 45 - 115 U/L Final     AST   Date Value Ref Range Status   05/30/2023 23 15 - 37 U/L Final     ALT   Date Value Ref Range Status   05/30/2023 50 16 - 61 U/L Final     Anion Gap   Date Value Ref Range Status   05/30/2023 11 7 - 16 mmol/L Final     eGFR    Date Value Ref Range Status   08/20/2020 114       Comment:     The above 20 analytes were performed by [id number] [place] [address],[place] [id number]     eGFR   Date Value Ref Range Status   08/20/2020 94       Lab Results   Component Value Date    HGBA1C 5.3 05/30/2023         OBJECTIVE  BP (!) 146/86 (BP Location: Right arm, Patient Position: Sitting)   Pulse 97   Temp 98.7 °F (37.1 °C) (Oral)   Resp 18   Ht 5' 9" (1.753 m)   Wt 85.7 kg (189 lb)   SpO2 100%   BMI " 27.91 kg/m²      Physical Exam  Vitals and nursing note reviewed.   Constitutional:       Appearance: Normal appearance. He is normal weight.   HENT:      Nose: Congestion present.      Mouth/Throat:      Mouth: Mucous membranes are moist.      Pharynx: Posterior oropharyngeal erythema present. No oropharyngeal exudate.   Cardiovascular:      Rate and Rhythm: Normal rate and regular rhythm.      Pulses: Normal pulses.      Heart sounds: Normal heart sounds.   Pulmonary:      Effort: Pulmonary effort is normal.      Breath sounds: Normal breath sounds.   Musculoskeletal:         General: Normal range of motion.      Cervical back: Normal range of motion and neck supple.   Skin:     General: Skin is warm.      Capillary Refill: Capillary refill takes less than 2 seconds.   Neurological:      Mental Status: He is alert. Mental status is at baseline.            ASSESSMENT & PLAN  1. Upper respiratory tract infection, unspecified type    2. Encounter for laboratory testing for COVID-19 virus    3. Cough, unspecified type    4. PVD (peripheral vascular disease)         Problem List Items Addressed This Visit          Cardiac/Vascular    PVD (peripheral vascular disease)    Overview     scheduled for angiogram today. cancelled due to acute illness. clinic staff notified vascular center of negative flu and covid tests          Other Visit Diagnoses       Upper respiratory tract infection, unspecified type    -  Primary    flu and covid neg  inj today    Relevant Medications    cefTRIAXone injection 1 g (Completed)    dexAMETHasone injection 4 mg (Completed)    Encounter for laboratory testing for COVID-19 virus        Relevant Orders    POCT COVID-19 Rapid Screening (Completed)    Cough, unspecified type        Relevant Orders    POCT Influenza A/B (Completed)            No follow-ups on file.

## 2024-01-18 DIAGNOSIS — Z09 HOSPITAL DISCHARGE FOLLOW-UP: ICD-10-CM

## 2024-01-18 DIAGNOSIS — J30.2 SEASONAL ALLERGIES: ICD-10-CM

## 2024-01-18 RX ORDER — CETIRIZINE HYDROCHLORIDE 10 MG/1
10 TABLET ORAL DAILY
Qty: 30 TABLET | Refills: 2 | Status: SHIPPED | OUTPATIENT
Start: 2024-01-18 | End: 2025-01-17

## 2024-01-18 RX ORDER — AMLODIPINE BESYLATE 10 MG/1
10 TABLET ORAL EVERY MORNING
Qty: 90 TABLET | Refills: 1 | Status: SHIPPED | OUTPATIENT
Start: 2024-01-18